# Patient Record
Sex: FEMALE | Race: BLACK OR AFRICAN AMERICAN | NOT HISPANIC OR LATINO
[De-identification: names, ages, dates, MRNs, and addresses within clinical notes are randomized per-mention and may not be internally consistent; named-entity substitution may affect disease eponyms.]

---

## 2017-01-10 ENCOUNTER — APPOINTMENT (OUTPATIENT)
Dept: CARDIOLOGY | Facility: CLINIC | Age: 72
End: 2017-01-10

## 2017-01-10 ENCOUNTER — NON-APPOINTMENT (OUTPATIENT)
Age: 72
End: 2017-01-10

## 2017-01-10 VITALS
SYSTOLIC BLOOD PRESSURE: 137 MMHG | BODY MASS INDEX: 32.9 KG/M2 | OXYGEN SATURATION: 100 % | HEIGHT: 63.5 IN | HEART RATE: 85 BPM | DIASTOLIC BLOOD PRESSURE: 78 MMHG | WEIGHT: 188 LBS | RESPIRATION RATE: 14 BRPM

## 2017-03-01 ENCOUNTER — APPOINTMENT (OUTPATIENT)
Dept: INTERNAL MEDICINE | Facility: CLINIC | Age: 72
End: 2017-03-01

## 2017-03-01 VITALS
BODY MASS INDEX: 33.95 KG/M2 | TEMPERATURE: 97.9 F | DIASTOLIC BLOOD PRESSURE: 72 MMHG | SYSTOLIC BLOOD PRESSURE: 154 MMHG | HEIGHT: 63.5 IN | HEART RATE: 88 BPM | WEIGHT: 194 LBS | OXYGEN SATURATION: 96 %

## 2017-03-01 VITALS — DIASTOLIC BLOOD PRESSURE: 60 MMHG | SYSTOLIC BLOOD PRESSURE: 130 MMHG

## 2017-03-01 DIAGNOSIS — Z63.4 DISAPPEARANCE AND DEATH OF FAMILY MEMBER: ICD-10-CM

## 2017-03-01 DIAGNOSIS — F43.20 ADJUSTMENT DISORDER, UNSPECIFIED: ICD-10-CM

## 2017-03-01 DIAGNOSIS — R00.2 PALPITATIONS: ICD-10-CM

## 2017-03-01 SDOH — SOCIAL STABILITY - SOCIAL INSECURITY: DISSAPEARANCE AND DEATH OF FAMILY MEMBER: Z63.4

## 2017-03-02 LAB
25(OH)D3 SERPL-MCNC: 29.8 NG/ML
ALBUMIN SERPL ELPH-MCNC: 4.1 G/DL
ALP BLD-CCNC: 87 U/L
ALT SERPL-CCNC: 11 U/L
ANION GAP SERPL CALC-SCNC: 17 MMOL/L
APPEARANCE: CLEAR
AST SERPL-CCNC: 9 U/L
BASOPHILS # BLD AUTO: 0.02 K/UL
BASOPHILS NFR BLD AUTO: 0.2 %
BILIRUB SERPL-MCNC: 0.5 MG/DL
BILIRUBIN URINE: NEGATIVE
BLOOD URINE: NEGATIVE
BUN SERPL-MCNC: 19 MG/DL
CALCIUM SERPL-MCNC: 10.2 MG/DL
CHLORIDE SERPL-SCNC: 103 MMOL/L
CHOLEST SERPL-MCNC: 155 MG/DL
CHOLEST/HDLC SERPL: 2.5 RATIO
CO2 SERPL-SCNC: 24 MMOL/L
COLOR: YELLOW
CREAT SERPL-MCNC: 0.82 MG/DL
CREAT SPEC-SCNC: 86 MG/DL
EOSINOPHIL # BLD AUTO: 0.06 K/UL
EOSINOPHIL NFR BLD AUTO: 0.6 %
GLUCOSE QUALITATIVE U: NORMAL MG/DL
GLUCOSE SERPL-MCNC: 144 MG/DL
HBA1C MFR BLD HPLC: 6.3 %
HCT VFR BLD CALC: 35.8 %
HCV AB SER QL: NONREACTIVE
HCV S/CO RATIO: 0.11 S/CO
HDLC SERPL-MCNC: 63 MG/DL
HGB BLD-MCNC: 11.8 G/DL
IMM GRANULOCYTES NFR BLD AUTO: 0.1 %
KETONES URINE: NEGATIVE
LDLC SERPL CALC-MCNC: 81 MG/DL
LEUKOCYTE ESTERASE URINE: NEGATIVE
LYMPHOCYTES # BLD AUTO: 3.21 K/UL
LYMPHOCYTES NFR BLD AUTO: 32.9 %
MAN DIFF?: NORMAL
MCHC RBC-ENTMCNC: 30.3 PG
MCHC RBC-ENTMCNC: 33 GM/DL
MCV RBC AUTO: 91.8 FL
MICROALBUMIN 24H UR DL<=1MG/L-MCNC: <0.3 MG/DL
MICROALBUMIN/CREAT 24H UR-RTO: NORMAL UG/MG
MONOCYTES # BLD AUTO: 0.52 K/UL
MONOCYTES NFR BLD AUTO: 5.3 %
NEUTROPHILS # BLD AUTO: 5.94 K/UL
NEUTROPHILS NFR BLD AUTO: 60.9 %
NITRITE URINE: NEGATIVE
PH URINE: 7.5
PLATELET # BLD AUTO: 399 K/UL
POTASSIUM SERPL-SCNC: 4.6 MMOL/L
PROT SERPL-MCNC: 7.3 G/DL
PROTEIN URINE: NEGATIVE MG/DL
RBC # BLD: 3.9 M/UL
RBC # FLD: 13.6 %
SODIUM SERPL-SCNC: 144 MMOL/L
SPECIFIC GRAVITY URINE: 1.02
TRIGL SERPL-MCNC: 53 MG/DL
TSH SERPL-ACNC: 1.39 UIU/ML
UROBILINOGEN URINE: 1 MG/DL
VIT B12 SERPL-MCNC: >2000 PG/ML
WBC # FLD AUTO: 9.76 K/UL

## 2017-03-06 ENCOUNTER — MEDICATION RENEWAL (OUTPATIENT)
Age: 72
End: 2017-03-06

## 2017-03-08 ENCOUNTER — MEDICATION RENEWAL (OUTPATIENT)
Age: 72
End: 2017-03-08

## 2017-04-07 ENCOUNTER — FORM ENCOUNTER (OUTPATIENT)
Age: 72
End: 2017-04-07

## 2017-04-08 ENCOUNTER — APPOINTMENT (OUTPATIENT)
Dept: MAMMOGRAPHY | Facility: IMAGING CENTER | Age: 72
End: 2017-04-08

## 2017-04-08 ENCOUNTER — OUTPATIENT (OUTPATIENT)
Dept: OUTPATIENT SERVICES | Facility: HOSPITAL | Age: 72
LOS: 1 days | End: 2017-04-08
Payer: MEDICARE

## 2017-04-08 DIAGNOSIS — Z00.8 ENCOUNTER FOR OTHER GENERAL EXAMINATION: ICD-10-CM

## 2017-04-08 PROCEDURE — G0202: CPT | Mod: 26

## 2017-04-08 PROCEDURE — 77067 SCR MAMMO BI INCL CAD: CPT

## 2017-04-08 PROCEDURE — 77063 BREAST TOMOSYNTHESIS BI: CPT | Mod: 26

## 2017-04-08 PROCEDURE — 77063 BREAST TOMOSYNTHESIS BI: CPT

## 2017-05-04 ENCOUNTER — RX RENEWAL (OUTPATIENT)
Age: 72
End: 2017-05-04

## 2017-06-19 ENCOUNTER — APPOINTMENT (OUTPATIENT)
Dept: INTERNAL MEDICINE | Facility: CLINIC | Age: 72
End: 2017-06-19

## 2017-06-19 ENCOUNTER — NON-APPOINTMENT (OUTPATIENT)
Age: 72
End: 2017-06-19

## 2017-06-19 VITALS
OXYGEN SATURATION: 95 % | HEART RATE: 87 BPM | DIASTOLIC BLOOD PRESSURE: 70 MMHG | SYSTOLIC BLOOD PRESSURE: 115 MMHG | WEIGHT: 202 LBS | TEMPERATURE: 98.5 F | BODY MASS INDEX: 35.35 KG/M2 | HEIGHT: 63.5 IN

## 2017-06-19 DIAGNOSIS — I49.49 OTHER PREMATURE DEPOLARIZATION: ICD-10-CM

## 2017-06-21 ENCOUNTER — LABORATORY RESULT (OUTPATIENT)
Age: 72
End: 2017-06-21

## 2017-06-21 LAB
ALBUMIN SERPL ELPH-MCNC: 4.1 G/DL
ALP BLD-CCNC: 89 U/L
ALT SERPL-CCNC: 10 U/L
ANION GAP SERPL CALC-SCNC: 14 MMOL/L
AST SERPL-CCNC: 15 U/L
BILIRUB SERPL-MCNC: 0.4 MG/DL
BUN SERPL-MCNC: 20 MG/DL
CALCIUM SERPL-MCNC: 10.2 MG/DL
CHLORIDE SERPL-SCNC: 101 MMOL/L
CO2 SERPL-SCNC: 25 MMOL/L
CREAT SERPL-MCNC: 0.94 MG/DL
GLUCOSE SERPL-MCNC: 137 MG/DL
POTASSIUM SERPL-SCNC: 4.3 MMOL/L
PROT SERPL-MCNC: 7.2 G/DL
SODIUM SERPL-SCNC: 140 MMOL/L
TSH SERPL-ACNC: 1.41 UIU/ML

## 2017-06-22 ENCOUNTER — MEDICATION RENEWAL (OUTPATIENT)
Age: 72
End: 2017-06-22

## 2017-07-03 ENCOUNTER — APPOINTMENT (OUTPATIENT)
Dept: INTERNAL MEDICINE | Facility: CLINIC | Age: 72
End: 2017-07-03

## 2017-07-03 VITALS
WEIGHT: 196 LBS | OXYGEN SATURATION: 97 % | TEMPERATURE: 98.5 F | HEART RATE: 92 BPM | DIASTOLIC BLOOD PRESSURE: 60 MMHG | SYSTOLIC BLOOD PRESSURE: 110 MMHG | HEIGHT: 63.5 IN | BODY MASS INDEX: 34.3 KG/M2

## 2017-07-03 DIAGNOSIS — K21.9 GASTRO-ESOPHAGEAL REFLUX DISEASE W/OUT ESOPHAGITIS: ICD-10-CM

## 2017-07-03 LAB — HBA1C MFR BLD HPLC: 6.8

## 2017-07-11 ENCOUNTER — APPOINTMENT (OUTPATIENT)
Dept: CARDIOLOGY | Facility: CLINIC | Age: 72
End: 2017-07-11

## 2017-07-11 ENCOUNTER — NON-APPOINTMENT (OUTPATIENT)
Age: 72
End: 2017-07-11

## 2017-07-11 VITALS — SYSTOLIC BLOOD PRESSURE: 110 MMHG | HEIGHT: 63.5 IN | DIASTOLIC BLOOD PRESSURE: 70 MMHG

## 2017-07-11 VITALS — HEART RATE: 80 BPM | OXYGEN SATURATION: 98 %

## 2017-07-11 VITALS — WEIGHT: 196 LBS | BODY MASS INDEX: 34.18 KG/M2

## 2017-07-18 ENCOUNTER — MEDICATION RENEWAL (OUTPATIENT)
Age: 72
End: 2017-07-18

## 2017-10-04 ENCOUNTER — APPOINTMENT (OUTPATIENT)
Dept: INTERNAL MEDICINE | Facility: CLINIC | Age: 72
End: 2017-10-04

## 2017-11-13 ENCOUNTER — RX RENEWAL (OUTPATIENT)
Age: 72
End: 2017-11-13

## 2018-01-24 ENCOUNTER — LABORATORY RESULT (OUTPATIENT)
Age: 73
End: 2018-01-24

## 2018-01-24 ENCOUNTER — APPOINTMENT (OUTPATIENT)
Dept: INTERNAL MEDICINE | Facility: CLINIC | Age: 73
End: 2018-01-24
Payer: MEDICARE

## 2018-01-24 VITALS
HEART RATE: 86 BPM | OXYGEN SATURATION: 97 % | BODY MASS INDEX: 33.95 KG/M2 | HEIGHT: 63.5 IN | SYSTOLIC BLOOD PRESSURE: 118 MMHG | DIASTOLIC BLOOD PRESSURE: 60 MMHG | WEIGHT: 194 LBS

## 2018-01-24 LAB — HBA1C MFR BLD HPLC: 6.7

## 2018-01-24 PROCEDURE — 99214 OFFICE O/P EST MOD 30 MIN: CPT | Mod: 25

## 2018-01-24 PROCEDURE — 83036 HEMOGLOBIN GLYCOSYLATED A1C: CPT | Mod: QW

## 2018-01-25 LAB
APPEARANCE: CLEAR
BILIRUBIN URINE: NEGATIVE
BLOOD URINE: NEGATIVE
COLOR: YELLOW
CREAT SPEC-SCNC: 62 MG/DL
GLUCOSE QUALITATIVE U: NEGATIVE MG/DL
KETONES URINE: NEGATIVE
LEUKOCYTE ESTERASE URINE: NEGATIVE
MICROALBUMIN 24H UR DL<=1MG/L-MCNC: <0.3 MG/DL
MICROALBUMIN/CREAT 24H UR-RTO: NORMAL
NITRITE URINE: NEGATIVE
PH URINE: 7
PROTEIN URINE: NEGATIVE MG/DL
SPECIFIC GRAVITY URINE: 1.01
UROBILINOGEN URINE: NEGATIVE MG/DL

## 2018-01-30 LAB — HEMOCCULT STL QL IA: NEGATIVE

## 2018-02-21 ENCOUNTER — MEDICATION RENEWAL (OUTPATIENT)
Age: 73
End: 2018-02-21

## 2018-03-09 ENCOUNTER — MEDICATION RENEWAL (OUTPATIENT)
Age: 73
End: 2018-03-09

## 2018-03-27 ENCOUNTER — MEDICATION RENEWAL (OUTPATIENT)
Age: 73
End: 2018-03-27

## 2018-04-13 ENCOUNTER — RX RENEWAL (OUTPATIENT)
Age: 73
End: 2018-04-13

## 2018-04-20 ENCOUNTER — FORM ENCOUNTER (OUTPATIENT)
Age: 73
End: 2018-04-20

## 2018-04-21 ENCOUNTER — OUTPATIENT (OUTPATIENT)
Dept: OUTPATIENT SERVICES | Facility: HOSPITAL | Age: 73
LOS: 1 days | End: 2018-04-21
Payer: MEDICARE

## 2018-04-21 ENCOUNTER — APPOINTMENT (OUTPATIENT)
Dept: MAMMOGRAPHY | Facility: IMAGING CENTER | Age: 73
End: 2018-04-21
Payer: MEDICARE

## 2018-04-21 DIAGNOSIS — Z00.8 ENCOUNTER FOR OTHER GENERAL EXAMINATION: ICD-10-CM

## 2018-04-21 PROCEDURE — 77067 SCR MAMMO BI INCL CAD: CPT | Mod: 26

## 2018-04-21 PROCEDURE — 77063 BREAST TOMOSYNTHESIS BI: CPT

## 2018-04-21 PROCEDURE — 77067 SCR MAMMO BI INCL CAD: CPT

## 2018-04-21 PROCEDURE — 77063 BREAST TOMOSYNTHESIS BI: CPT | Mod: 26

## 2018-04-27 ENCOUNTER — LABORATORY RESULT (OUTPATIENT)
Age: 73
End: 2018-04-27

## 2018-04-27 ENCOUNTER — APPOINTMENT (OUTPATIENT)
Dept: INTERNAL MEDICINE | Facility: CLINIC | Age: 73
End: 2018-04-27
Payer: MEDICARE

## 2018-04-27 VITALS
SYSTOLIC BLOOD PRESSURE: 115 MMHG | HEART RATE: 81 BPM | BODY MASS INDEX: 34.65 KG/M2 | DIASTOLIC BLOOD PRESSURE: 80 MMHG | TEMPERATURE: 98.1 F | HEIGHT: 63.5 IN | OXYGEN SATURATION: 97 % | WEIGHT: 198 LBS

## 2018-04-27 DIAGNOSIS — K80.20 CALCULUS OF GALLBLADDER W/OUT CHOLECYSTITIS W/OUT OBSTRUCTION: ICD-10-CM

## 2018-04-27 DIAGNOSIS — E78.00 PURE HYPERCHOLESTEROLEMIA, UNSPECIFIED: ICD-10-CM

## 2018-04-27 DIAGNOSIS — N28.1 CYST OF KIDNEY, ACQUIRED: ICD-10-CM

## 2018-04-27 PROCEDURE — 36415 COLL VENOUS BLD VENIPUNCTURE: CPT

## 2018-04-27 PROCEDURE — G0439: CPT

## 2018-04-30 LAB
25(OH)D3 SERPL-MCNC: 35.7 NG/ML
ALBUMIN SERPL ELPH-MCNC: 3.9 G/DL
ALP BLD-CCNC: 85 U/L
ALT SERPL-CCNC: 10 U/L
ANION GAP SERPL CALC-SCNC: 15 MMOL/L
APPEARANCE: CLEAR
AST SERPL-CCNC: 13 U/L
BASOPHILS # BLD AUTO: 0.02 K/UL
BASOPHILS NFR BLD AUTO: 0.2 %
BILIRUB SERPL-MCNC: 0.6 MG/DL
BILIRUBIN URINE: NEGATIVE
BLOOD URINE: ABNORMAL
BUN SERPL-MCNC: 13 MG/DL
CALCIUM SERPL-MCNC: 9.8 MG/DL
CHLORIDE SERPL-SCNC: 106 MMOL/L
CHOLEST SERPL-MCNC: 132 MG/DL
CHOLEST/HDLC SERPL: 2.3 RATIO
CO2 SERPL-SCNC: 22 MMOL/L
COLOR: YELLOW
CREAT SERPL-MCNC: 0.74 MG/DL
EOSINOPHIL # BLD AUTO: 0.08 K/UL
EOSINOPHIL NFR BLD AUTO: 0.9 %
GLUCOSE QUALITATIVE U: NEGATIVE MG/DL
GLUCOSE SERPL-MCNC: 126 MG/DL
HBA1C MFR BLD HPLC: 6.4 %
HCT VFR BLD CALC: 36.1 %
HCV AB SER QL: NONREACTIVE
HCV S/CO RATIO: 0.1 S/CO
HDLC SERPL-MCNC: 58 MG/DL
HGB BLD-MCNC: 11.8 G/DL
IMM GRANULOCYTES NFR BLD AUTO: 0.2 %
KETONES URINE: NEGATIVE
LDLC SERPL CALC-MCNC: 62 MG/DL
LEUKOCYTE ESTERASE URINE: NEGATIVE
LYMPHOCYTES # BLD AUTO: 3.13 K/UL
LYMPHOCYTES NFR BLD AUTO: 34 %
MAN DIFF?: NORMAL
MCHC RBC-ENTMCNC: 30 PG
MCHC RBC-ENTMCNC: 32.7 GM/DL
MCV RBC AUTO: 91.9 FL
MONOCYTES # BLD AUTO: 0.5 K/UL
MONOCYTES NFR BLD AUTO: 5.4 %
NEUTROPHILS # BLD AUTO: 5.46 K/UL
NEUTROPHILS NFR BLD AUTO: 59.3 %
NITRITE URINE: NEGATIVE
PH URINE: 7.5
PLATELET # BLD AUTO: 345 K/UL
POTASSIUM SERPL-SCNC: 4.5 MMOL/L
PROT SERPL-MCNC: 6.8 G/DL
PROTEIN URINE: NEGATIVE MG/DL
RBC # BLD: 3.93 M/UL
RBC # FLD: 13 %
SODIUM SERPL-SCNC: 143 MMOL/L
SPECIFIC GRAVITY URINE: 1.02
TRIGL SERPL-MCNC: 62 MG/DL
TSH SERPL-ACNC: 1.94 UIU/ML
UROBILINOGEN URINE: NEGATIVE MG/DL
VIT B12 SERPL-MCNC: >2000 PG/ML
WBC # FLD AUTO: 9.21 K/UL

## 2018-07-16 ENCOUNTER — APPOINTMENT (OUTPATIENT)
Dept: CARDIOLOGY | Facility: CLINIC | Age: 73
End: 2018-07-16

## 2018-07-27 ENCOUNTER — NON-APPOINTMENT (OUTPATIENT)
Age: 73
End: 2018-07-27

## 2018-07-27 ENCOUNTER — APPOINTMENT (OUTPATIENT)
Dept: INTERNAL MEDICINE | Facility: CLINIC | Age: 73
End: 2018-07-27
Payer: MEDICARE

## 2018-07-27 VITALS
DIASTOLIC BLOOD PRESSURE: 60 MMHG | TEMPERATURE: 98.1 F | SYSTOLIC BLOOD PRESSURE: 120 MMHG | HEART RATE: 84 BPM | BODY MASS INDEX: 33.77 KG/M2 | HEIGHT: 63.5 IN | OXYGEN SATURATION: 98 % | WEIGHT: 193 LBS

## 2018-07-27 PROCEDURE — 99214 OFFICE O/P EST MOD 30 MIN: CPT | Mod: 25

## 2018-07-27 PROCEDURE — 36415 COLL VENOUS BLD VENIPUNCTURE: CPT

## 2018-07-27 PROCEDURE — 93000 ELECTROCARDIOGRAM COMPLETE: CPT

## 2018-07-27 NOTE — HISTORY OF PRESENT ILLNESS
[Family Member] : family member [Other: _____] : [unfilled] [FreeTextEntry1] : came in for follow up \par \par not sleeping at night - finding problems with every little things , depressed -low mood - sad feeling - not caring for self - lives alone at home since her   2 yrs ago -her daughter works full time - she does not want to go live with her \par - melatonin does not help \par - needs some thing stronger \par -Spouse  -2016 \par - no thoughts of suicide or homicide \par - as per daughter she has noticed her to be sitting lonley and remembering thing again \par - she will have her see psychologist who speaks her language , needs referral \par \par lower flank pain and urine symptoms - saw urologist - did xray and ct abd pelvis , renal cyst 2 left small , cholelithiasis , DJD spine \par \par Diabetic- monitors Accu-Cheks readings- 129-130  mainly, no values in 200, saw Ophth 2 weeks ago -stable + retinopathy , and glaucoma , did b/l cataract Sx , denies any hypoglycemic episode.\par refused flu vaccine and Pneumovax \par - taking metformin 500 twice a day as her sugars were good , in past was on 1000 bid \par \par Hyperlipidemia- taking cholesterol medication , no muscle pain. \par \par Hypertension- no CP , sob, palpitation or dizzy spells, compliant with medication , saw cardio Had stress and echo that were normal. No other complaints. Began taking asa 81 mg daily. \par \par GERD- acid reflux with certain foods occ. after eating , was see n in  for chest discomfort - her  omeprazole was d/christopher and started on pepcid - she is doing well with it , no cp , son palpitations  \par \par Obesity-lost 4 lbs since last visit , eating and not exercising \par \par

## 2018-07-27 NOTE — ASSESSMENT
[FreeTextEntry1] : \par Depression/ anxiety - PHQ-9 score 10 \par Mini-Mental score 29/30 negative \par -start Remeron 7.5 po qhs increase to 15 if tolerated in 1 month f/u 6 weeks \par -Depression screening done at this visit. 15 minute spent in assessment and review.\par -Denies homicidal or suicidal ideas or thoughts\par -Discussed with patient in detail side effects of all medications, hand out given, advise patient to inform family member that he or she is taking the  medication and to watch out for any personality changes, to seek medical attention immediately if they notice any difference. \par -Make appointment to see psychiatrist and therapist on a regular basis.\par \par Diabetes-get aic \par -on metformin to 500 po bid , rtc 3 months. Monitor Accu-Cheks daily, - Monitor for signs of hypoglycemia. \par Continue 1800 kcal ADA diet, avoid rice, pasta, sugar, sweet, soda, juices, advised to exercise daily.\par -advised patient to continue walking and exercise.and loose weight \par - f/u ophtho \par \par Hyperlipidemia\par Controlled, continue current medications,\par Low-fat diet, avoid red meat, cheese, butter, peanuts and exercise daily for 40 minutes.\par \par Hypertension stable  - stress test echo negative , cardio consult reviewed 12/2016 \par -EKG- nsr at 75 bpm no acute st changes \par -Controlled, continue current medications, low sodium-DASH diet., discussed with patient avoid canned food, process food, fast food, also, advised to take 3-4 servings of fruits and vegetables a day.\par \par morbid obesity BMI 33- discussed with patient avoid carbohydrate and fat, encouraged patient to start exercising 40 minutes daily, lose weight.\par \par insomnia- sleep hygiene reviewed ,no help with  melatonin 4 mg increase to 8 mg daily qhs , reduce caffine intake after 7 pm \par -start Ambien 5 mg at bedtime PRN fall precaution discussed \par \par Cholilithiasis - refused sx \par \par Health Maintenance \par Flu, pneumovax, zostavax, tetanus vaccine - refused \par colonoscope- 2010, FIT 2016 neg ordered today [facility] again  has referral for gastro to make apt \par mammo- 4/2018 \par PAP- up to date as per pt

## 2018-07-30 LAB
APPEARANCE: CLEAR
BASOPHILS # BLD AUTO: 0.03 K/UL
BASOPHILS NFR BLD AUTO: 0.3 %
BILIRUBIN URINE: NEGATIVE
BLOOD URINE: NEGATIVE
COLOR: YELLOW
EOSINOPHIL # BLD AUTO: 0.07 K/UL
EOSINOPHIL NFR BLD AUTO: 0.7 %
GLUCOSE QUALITATIVE U: NEGATIVE MG/DL
HBA1C MFR BLD HPLC: 6.4 %
HCT VFR BLD CALC: 37.9 %
HGB BLD-MCNC: 12.2 G/DL
IMM GRANULOCYTES NFR BLD AUTO: 0.2 %
KETONES URINE: NEGATIVE
LEUKOCYTE ESTERASE URINE: NEGATIVE
LYMPHOCYTES # BLD AUTO: 3.51 K/UL
LYMPHOCYTES NFR BLD AUTO: 34.5 %
MAN DIFF?: NORMAL
MCHC RBC-ENTMCNC: 29.3 PG
MCHC RBC-ENTMCNC: 32.2 GM/DL
MCV RBC AUTO: 91.1 FL
MONOCYTES # BLD AUTO: 0.57 K/UL
MONOCYTES NFR BLD AUTO: 5.6 %
NEUTROPHILS # BLD AUTO: 5.97 K/UL
NEUTROPHILS NFR BLD AUTO: 58.7 %
NITRITE URINE: NEGATIVE
PH URINE: 7
PLATELET # BLD AUTO: 398 K/UL
PROTEIN URINE: NEGATIVE MG/DL
RBC # BLD: 4.16 M/UL
RBC # FLD: 13.3 %
SPECIFIC GRAVITY URINE: 1.02
TSH SERPL-ACNC: 1.15 UIU/ML
UROBILINOGEN URINE: 1 MG/DL
WBC # FLD AUTO: 10.17 K/UL

## 2018-07-31 ENCOUNTER — MEDICATION RENEWAL (OUTPATIENT)
Age: 73
End: 2018-07-31

## 2018-08-03 RX ORDER — ZOLPIDEM TARTRATE 5 MG/1
5 TABLET ORAL
Qty: 15 | Refills: 0 | Status: DISCONTINUED | COMMUNITY
Start: 2018-07-27 | End: 2018-08-03

## 2018-08-07 ENCOUNTER — RX RENEWAL (OUTPATIENT)
Age: 73
End: 2018-08-07

## 2018-09-05 ENCOUNTER — APPOINTMENT (OUTPATIENT)
Dept: INTERNAL MEDICINE | Facility: CLINIC | Age: 73
End: 2018-09-05
Payer: MEDICARE

## 2018-09-05 VITALS
OXYGEN SATURATION: 99 % | TEMPERATURE: 98.7 F | WEIGHT: 191 LBS | BODY MASS INDEX: 33.42 KG/M2 | HEIGHT: 63.5 IN | SYSTOLIC BLOOD PRESSURE: 105 MMHG | HEART RATE: 87 BPM | DIASTOLIC BLOOD PRESSURE: 60 MMHG

## 2018-09-05 PROCEDURE — 99214 OFFICE O/P EST MOD 30 MIN: CPT

## 2018-09-05 NOTE — ASSESSMENT
[FreeTextEntry1] : \par Depression/ anxiety - PHQ-9 score 10 \par Mini-Mental score 29/30 negative \par -continue  Remeron 7.5 po qhs 6 months refill \par -Depression screening done at this visit. 15 minute spent in assessment and review.\par -Denies homicidal or suicidal ideas or thoughts\par -Discussed with patient in detail side effects of all medications, hand out given, advise patient to inform family member that he or she is taking the  medication and to watch out for any personality changes, to seek medical attention immediately if they notice any difference. \par -Make appointment to see psychiatrist and therapist on a regular basis.\par \par Diabetes-Aic was 6.4 7/2018 \par -on metformin to 500 po bid , rtc 3 months. Monitor Accu-Cheks daily, - Monitor for signs of hypoglycemia. \par Continue 1800 kcal ADA diet, avoid rice, pasta, sugar, sweet, soda, juices, advised to exercise daily.\par -advised patient to continue walking and exercise.and loose weight \par - f/u ophtho \par \par Hyperlipidemia\par Controlled, continue current medications,\par Low-fat diet, avoid red meat, cheese, butter, peanuts and exercise daily for 40 minutes.\par \par Hypertension stable  - stress test echo negative , cardio consult reviewed 12/2016 \par -EKG- nsr at 75 bpm no acute st changes \par -Controlled, continue current medications, low sodium-DASH diet., discussed with patient avoid canned food, process food, fast food, also, advised to take 3-4 servings of fruits and vegetables a day.\par \par morbid obesity BMI 33- discussed with patient avoid carbohydrate and fat, encouraged patient to start exercising 40 minutes daily, lose weight.\par \par insomnia- sleep hygiene reviewed ,no help with  melatonin 4 mg increase to 8 mg daily qhs , reduce caffine intake after 7 pm \par -on tamezepam as needed at bedtime PRN fall precaution discussed \par \par Cholelithiasis - refused sx \par \par Health Maintenance \par Flu, pneumovax, zostavax, tetanus vaccine - refused \par colonoscope- 2010, FIT 2016 neg ordered today [facility] again  has referral for gastro to make apt \par mammo- 4/2018 \par PAP- up to date as per pt

## 2018-09-05 NOTE — HISTORY OF PRESENT ILLNESS
[Other: _____] : [unfilled] [FreeTextEntry1] : came in for follow up \par \par sleeping better at night - \par -taking temazepam as needed - so far only took 3 pills \par - Remeron 7.5 seem to work needs refill \par -- finding problems with every little things , depressed -low mood - sad feeling - not caring for self - lives alone at home since her   2 yrs ago -her daughter works full time - she does not want to go live with her \par - melatonin does not help \par - needs some thing stronger \par -Spouse  -2016 \par - no thoughts of suicide or homicide \par - as per daughter she has noticed her to be sitting lonely and remembering thing again \par - she will have her see psychologist who speaks her language , needs referral \par \par lower flank pain and urine symptoms - saw urologist - did xray and ct abd pelvis , renal cyst 2 left small , cholelithiasis , DJD spine \par \par Diabetic- monitors Accu-Cheks readings- 129-130  mainly, no values in 200, saw Ophth 2 weeks ago -stable + retinopathy , and glaucoma , did b/l cataract Sx , denies any hypoglycemic episode.\par refused flu vaccine and Pneumovax \par - taking metformin 500 twice a day as her sugars were good , in past was on 1000 bid \par \par Hyperlipidemia- taking cholesterol medication , no muscle pain. \par \par Hypertension- no CP , sob, palpitation or dizzy spells, compliant with medication , saw cardio Had stress and echo that were normal. No other complaints. Began taking asa 81 mg daily. \par \par GERD- acid reflux with certain foods occ. after eating , was see n in  for chest discomfort - her  omeprazole was d/christopher and started on pepcid - she is doing well with it , no cp , son palpitations  \par \par Obesity-lost 4 lbs since last visit , eating and not exercising \par \par

## 2018-12-07 ENCOUNTER — APPOINTMENT (OUTPATIENT)
Dept: INTERNAL MEDICINE | Facility: CLINIC | Age: 73
End: 2018-12-07
Payer: MEDICARE

## 2018-12-07 VITALS
OXYGEN SATURATION: 98 % | WEIGHT: 182 LBS | BODY MASS INDEX: 31.85 KG/M2 | HEIGHT: 63.5 IN | DIASTOLIC BLOOD PRESSURE: 70 MMHG | SYSTOLIC BLOOD PRESSURE: 144 MMHG | TEMPERATURE: 98.4 F | HEART RATE: 74 BPM

## 2018-12-07 DIAGNOSIS — M25.561 PAIN IN RIGHT KNEE: ICD-10-CM

## 2018-12-07 DIAGNOSIS — M25.562 PAIN IN RIGHT KNEE: ICD-10-CM

## 2018-12-07 LAB
APPEARANCE: CLEAR
BILIRUBIN URINE: NEGATIVE
BLOOD URINE: NEGATIVE
COLOR: YELLOW
GLUCOSE QUALITATIVE U: NEGATIVE MG/DL
HBA1C MFR BLD HPLC: 6.4
KETONES URINE: NEGATIVE
LEUKOCYTE ESTERASE URINE: NEGATIVE
NITRITE URINE: NEGATIVE
PH URINE: 7.5
PROTEIN URINE: NEGATIVE MG/DL
SPECIFIC GRAVITY URINE: 1.01
UROBILINOGEN URINE: NEGATIVE MG/DL

## 2018-12-07 PROCEDURE — 83036 HEMOGLOBIN GLYCOSYLATED A1C: CPT | Mod: QW

## 2018-12-07 PROCEDURE — 99214 OFFICE O/P EST MOD 30 MIN: CPT | Mod: 25

## 2018-12-07 NOTE — HISTORY OF PRESENT ILLNESS
[Family Member] : family member [Other: _____] : [unfilled] [FreeTextEntry1] : came in for follow up \par \par sleeping better at night - \par -taking temazepam as needed \par - Remeron 7.5 seem to work needs refill \par -- finding problems with every little things , depressed -low mood - sad feeling - not caring for self - lives alone at home since her   2 yrs ago -her daughter works full time - she does not want to go live with her \par - melatonin does not help \par - needs some thing stronger \par -Spouse  -2016 \par - no thoughts of suicide or homicide \par - as per daughter she has noticed her to be sitting lonely and remembering thing again \par - she will have her see psychologist who speaks her language , needs referral \par - cannot do things herself living alone - needs help with BADL - cooking and grooming - 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 \par \par Hypertension- no CP , sob, palpitation or dizzy spells, compliant with medication , saw cardio Had stress and echo that were normal. No other complaints. Began taking asa 81 mg daily. \par \par GERD- acid reflux with certain foods occ. after eating , was see n in  for chest discomfort - her  omeprazole was d/christopher and started on pepcid - she is doing well with it , no cp , son palpitations  \par \par Obesity-lost 4 lbs since last visit , eating and not exercising \par \par

## 2018-12-07 NOTE — HEALTH RISK ASSESSMENT
[0] : 2) Feeling down, depressed, or hopeless: Not at all (0) [(PHQ-2) Unable to screen] : PHQ-2: unable to screen [[id number]] : 0

## 2018-12-07 NOTE — ASSESSMENT
[FreeTextEntry1] : \par Depression/ anxiety - better now smiling more engaged \par Mini-Mental score 29/30 negative \par -continue  Remeron 7.5 po qhs 6 months refill \par -Depression screening done at this visit. 15 minute spent in assessment and review.\par -Denies homicidal or suicidal ideas or thoughts\par -Discussed with patient in detail side effects of all medications, hand out given, advise patient to inform family member that he or she is taking the  medication and to watch out for any personality changes, to seek medical attention immediately if they notice any difference. \par -Make appointment to see psychiatrist and therapist on a regular basis.\par \par Diabetes-POC AIC - 6.4 stable \par -on metformin to 500 po bid , rtc 3 months. Monitor Accu-Cheks daily, - Monitor for signs of hypoglycemia. \par Continue 1800 kcal ADA diet, avoid rice, pasta, sugar, sweet, soda, juices, advised to exercise daily.\par -advised patient to continue walking and exercise.and loose weight \par - f/u ophtho \par \par Hyperlipidemia\par Controlled, continue current medications,\par Low-fat diet, avoid red meat, cheese, butter, peanuts and exercise daily for 40 minutes.\par \par Hypertension stable  - stress test echo negative , cardio consult reviewed 7/2017 \par -EKG- nsr at 75 bpm no acute st changes \par -Controlled, continue current medications, low sodium-DASH diet., discussed with patient avoid canned food, process food, fast food, also, advised to take 3-4 servings of fruits and vegetables a day.\par \par morbid obesity BMI 33- discussed with patient avoid carbohydrate and fat, encouraged patient to start exercising 40 minutes daily, lose weight.\par \par insomnia- sleep hygiene reviewed ,no help with  melatonin 4 mg increase to 8 mg daily qhs , reduce caffine intake after 7 pm \par -on tamezepam as needed at bedtime PRN fall precaution discussed \par \par Cholelithiasis - refused sx \par \par Health Maintenance \par Flu, pneumovax, zostavax, tetanus vaccine - refused \par colonoscope- 2010, FIT 2018 neg, advised colonoscope referral to gi given \par mammo- 4/2018 \par PAP- up to date as per pt \par \par rtc cpe

## 2018-12-11 ENCOUNTER — RX RENEWAL (OUTPATIENT)
Age: 73
End: 2018-12-11

## 2019-01-03 ENCOUNTER — MEDICATION RENEWAL (OUTPATIENT)
Age: 74
End: 2019-01-03

## 2019-01-30 ENCOUNTER — APPOINTMENT (OUTPATIENT)
Dept: INTERNAL MEDICINE | Facility: CLINIC | Age: 74
End: 2019-01-30

## 2019-03-04 ENCOUNTER — APPOINTMENT (OUTPATIENT)
Dept: INTERNAL MEDICINE | Facility: CLINIC | Age: 74
End: 2019-03-04
Payer: MEDICARE

## 2019-03-04 VITALS
WEIGHT: 184 LBS | TEMPERATURE: 98.3 F | OXYGEN SATURATION: 98 % | SYSTOLIC BLOOD PRESSURE: 132 MMHG | HEIGHT: 63.5 IN | HEART RATE: 84 BPM | DIASTOLIC BLOOD PRESSURE: 66 MMHG | BODY MASS INDEX: 32.2 KG/M2

## 2019-03-04 VITALS — WEIGHT: 194 LBS | BODY MASS INDEX: 33.83 KG/M2

## 2019-03-04 LAB — HBA1C MFR BLD HPLC: 6.3

## 2019-03-04 PROCEDURE — 83036 HEMOGLOBIN GLYCOSYLATED A1C: CPT | Mod: QW

## 2019-03-04 PROCEDURE — 99214 OFFICE O/P EST MOD 30 MIN: CPT | Mod: 25

## 2019-03-04 RX ORDER — FAMOTIDINE 20 MG/1
20 TABLET, FILM COATED ORAL
Qty: 90 | Refills: 0 | Status: COMPLETED | COMMUNITY
Start: 2018-12-07 | End: 2019-03-04

## 2019-03-04 NOTE — REVIEW OF SYSTEMS
[Negative] : Gastrointestinal [Chest Pain] : no chest pain [Lower Ext Edema] : no lower extremity edema [Shortness Of Breath] : no shortness of breath [Dyspnea on Exertion] : no dyspnea on exertion [Abdominal Pain] : no abdominal pain

## 2019-03-04 NOTE — HISTORY OF PRESENT ILLNESS
[Other: _____] : [unfilled] [FreeTextEntry1] : came in for follow up needs HHA forms filled \par \par sleeping better at night - \par -taking temazepam as needed \par - Remeron 7.5 seem to work needs refill - no side effects \par -- finding problems with every little things , depressed -low mood - sad feeling - not caring for self - lives alone at home since her   2 yrs ago -her daughter works full time - she does not want to go live with her \par - melatonin does not help \par - needs some thing stronger \par -Spouse  -2016 \par - no thoughts of suicide or homicide \par - as per daughter she has noticed her to be sitting lonely and remembering thing again \par - she will have her see psychologist who speaks her language , needs referral \par - cannot do things herself living alone - needs help with BADL - cooking and grooming - 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 \par \par Hypertension- no CP , sob, palpitation or dizzy spells, compliant with medication , saw cardio Had stress and echo that were normal. No other complaints. Began taking asa 81 mg daily. \par \par GERD- acid reflux with certain foods occ. after eating , was see n in  for chest discomfort - her  omeprazole was d/christopher and started on pepcid - she is doing well with it , no cp , son palpitations  \par \par Obesity-lost 4 lbs since last visit , eating and not exercising \par \par

## 2019-03-04 NOTE — ASSESSMENT
[FreeTextEntry1] : \par Depression/ anxiety - better now smiling more engaged -no side effects \par -continue  Remeron 7.5 po qhs 6 months refill \par -Depression screening done at this visit. 15 minute spent in assessment and review.\par -Denies homicidal or suicidal ideas or thoughts\par -Discussed with patient in detail side effects of all medications, hand out given, advise patient to inform family member that he or she is taking the  medication and to watch out for any personality changes, to seek medical attention immediately if they notice any difference. \par -Make appointment to see psychiatrist and therapist on a regular basis.\par \par Diabetes-POC AIC - 6.3 stable \par -on metformin to 500 po bid , rtc 3 months. Monitor Accu-Cheks daily, - Monitor for signs of hypoglycemia. \par Continue 1800 kcal ADA diet, avoid rice, pasta, sugar, sweet, soda, juices, advised to exercise daily.\par -advised patient to continue walking and exercise.and loose weight \par - f/u ophtho last visit was 10/18/18 \par \par Hyperlipidemia\par Controlled, continue current medications,\par Low-fat diet, avoid red meat, cheese, butter, peanuts and exercise daily for 40 minutes.\par \par Hypertension stable  - stress test echo negative , cardio consult reviewed 7/2017 \par -Controlled, continue current medications, low sodium-DASH diet., discussed with patient avoid canned food, process food, fast food, also, advised to take 3-4 servings of fruits and vegetables a day.\par \par morbid obesity BMI 33- discussed with patient avoid carbohydrate and fat, encouraged patient to start exercising 40 minutes daily, lose weight.\par \par insomnia- sleep hygiene reviewed ,no help with  melatonin 4 mg increase to 8 mg daily qhs , reduce caffine intake after 7 pm \par -on tamezepam as needed at bedtime PRN fall precaution discussed \par \par Cholelithiasis - refused sx \par \par Health Maintenance \par Flu, pneumovax, zostavax, tetanus vaccine - refused \par colonoscope- 2010, FIT 2018 neg, advised colonoscope referral to gi given \par mammo- 4/2018 \par PAP- up to date as per pt \par \par rtc cpe

## 2019-05-14 ENCOUNTER — MEDICATION RENEWAL (OUTPATIENT)
Age: 74
End: 2019-05-14

## 2019-05-15 ENCOUNTER — MEDICATION RENEWAL (OUTPATIENT)
Age: 74
End: 2019-05-15

## 2019-06-07 ENCOUNTER — MEDICATION RENEWAL (OUTPATIENT)
Age: 74
End: 2019-06-07

## 2019-06-12 ENCOUNTER — APPOINTMENT (OUTPATIENT)
Dept: INTERNAL MEDICINE | Facility: CLINIC | Age: 74
End: 2019-06-12
Payer: MEDICARE

## 2019-06-12 VITALS
BODY MASS INDEX: 35.17 KG/M2 | TEMPERATURE: 97.9 F | SYSTOLIC BLOOD PRESSURE: 130 MMHG | OXYGEN SATURATION: 98 % | HEIGHT: 63.5 IN | DIASTOLIC BLOOD PRESSURE: 70 MMHG | WEIGHT: 201 LBS | HEART RATE: 84 BPM

## 2019-06-12 PROCEDURE — 99214 OFFICE O/P EST MOD 30 MIN: CPT | Mod: 25

## 2019-06-12 PROCEDURE — 36415 COLL VENOUS BLD VENIPUNCTURE: CPT

## 2019-06-12 NOTE — PHYSICAL EXAM
[No Acute Distress] : no acute distress [Well Nourished] : well nourished [Well Developed] : well developed [No Respiratory Distress] : no respiratory distress  [Clear to Auscultation] : lungs were clear to auscultation bilaterally [No Accessory Muscle Use] : no accessory muscle use [Normal Rate] : normal rate  [Normal S1, S2] : normal S1 and S2 [Regular Rhythm] : with a regular rhythm [Soft] : abdomen soft [Non Tender] : non-tender [Normal Bowel Sounds] : normal bowel sounds

## 2019-06-12 NOTE — HISTORY OF PRESENT ILLNESS
[FreeTextEntry1] : came in 22 min late for CPE seen today as f/u for ch medical conditions \par \par sleeping better at night - \par -taking temazepam as needed \par - Remeron 7.5 seem to work needs refill - no side effects \par -- finding problems with every little things , depressed -low mood - sad feeling - not caring for self - lives alone at home since her   2 yrs ago -her daughter works full time - she does not want to go live with her \par - melatonin does not help \par - needs some thing stronger \par -Spouse  -2016 \par - no thoughts of suicide or homicide \par - as per daughter she has noticed her to be sitting lonely and remembering thing again \par - she will have her see psychologist who speaks her language , needs referral \par - cannot do things herself living alone - needs help with BADL - cooking and grooming - 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 \par \par Hypertension- no CP , sob, palpitation or dizzy spells, compliant with medication , saw cardio Had stress and echo that were normal. No other complaints. Began taking asa 81 mg daily. \par \par GERD- acid reflux with certain foods occ. after eating , was see n in  for chest discomfort - her  omeprazole was d/christopher and started on pepcid - she is doing well with it , no cp , son palpitations  \par \par Obesity-lost 4 lbs since last visit , eating and not exercising \par \par

## 2019-06-12 NOTE — REVIEW OF SYSTEMS
[Negative] : Gastrointestinal [Chest Pain] : no chest pain [Lower Ext Edema] : no lower extremity edema [Shortness Of Breath] : no shortness of breath [Abdominal Pain] : no abdominal pain [Dyspnea on Exertion] : no dyspnea on exertion

## 2019-06-12 NOTE — ASSESSMENT
[FreeTextEntry1] : BMI - 35 gained 22 lbs in 6 months \par -check lipids, AIC , TSH \par - discussed caloric control , portion control , weight loss, increase physical activity\par \par Depression/ anxiety - better now smiling more engaged -no side effects \par -continue  Remeron 7.5 po qhs 6 months refill \par -Depression screening done at this visit. 15 minute spent in assessment and review.\par -Denies homicidal or suicidal ideas or thoughts\par -Discussed with patient in detail side effects of all medications, hand out given, advise patient to inform family member that he or she is taking the  medication and to watch out for any personality changes, to seek medical attention immediately if they notice any difference. \par -Make appointment to see psychiatrist and therapist on a regular basis.\par \par Diabetes-POC AIC - 6.3 stable 3/2019 - has appt ophtho next week \par -on metformin to 500 po bid , rtc 3 months. Monitor Accu-Cheks daily, - Monitor for signs of hypoglycemia. \par Continue 1800 kcal ADA diet, avoid rice, pasta, sugar, sweet, soda, juices, advised to exercise daily.\par -advised patient to continue walking and exercise.and loose weight \par - f/u ophtho last visit was 10/18/18 \par \par Hyperlipidemia\par Controlled, continue current medications,\par Low-fat diet, avoid red meat, cheese, butter, peanuts and exercise daily for 40 minutes.\par \par Hypertension stable  - stress test echo negative , cardio consult reviewed 7/2017 \par -Controlled, continue current medications, low sodium-DASH diet., discussed with patient avoid canned food, process food, fast food, also, advised to take 3-4 servings of fruits and vegetables a day.\par \par morbid obesity BMI 33- discussed with patient avoid carbohydrate and fat, encouraged patient to start exercising 40 minutes daily, lose weight.\par \par insomnia- sleep hygiene reviewed ,no help with  melatonin 4 mg increase to 8 mg daily qhs , reduce caffine intake after 7 pm \par -on temazepam as needed at bedtime PRN fall precaution discussed \par \par Cholelithiasis - refused sx \par \par Health Maintenance \par Flu, pneumovax, zostavax, tetanus vaccine - refused \par colonoscope- 2010, FIT 2018 neg, advised colonoscope referral to gi given \par mammo- 4/2018 - referral given \par PAP- up to date as per pt \par \par rtc cpe

## 2019-06-13 LAB
25(OH)D3 SERPL-MCNC: 38.8 NG/ML
ALBUMIN SERPL ELPH-MCNC: 4.2 G/DL
ALP BLD-CCNC: 78 U/L
ALT SERPL-CCNC: 14 U/L
ANION GAP SERPL CALC-SCNC: 12 MMOL/L
APPEARANCE: CLEAR
AST SERPL-CCNC: 16 U/L
BILIRUB SERPL-MCNC: 0.4 MG/DL
BILIRUBIN URINE: NEGATIVE
BLOOD URINE: NEGATIVE
BUN SERPL-MCNC: 19 MG/DL
CALCIUM SERPL-MCNC: 10.1 MG/DL
CHLORIDE SERPL-SCNC: 102 MMOL/L
CHOLEST SERPL-MCNC: 138 MG/DL
CHOLEST/HDLC SERPL: 2.4 RATIO
CO2 SERPL-SCNC: 26 MMOL/L
COLOR: NORMAL
CREAT SERPL-MCNC: 0.79 MG/DL
ESTIMATED AVERAGE GLUCOSE: 137 MG/DL
GLUCOSE QUALITATIVE U: NEGATIVE
GLUCOSE SERPL-MCNC: 133 MG/DL
HBA1C MFR BLD HPLC: 6.4 %
HDLC SERPL-MCNC: 57 MG/DL
KETONES URINE: NEGATIVE
LDLC SERPL CALC-MCNC: 71 MG/DL
LEUKOCYTE ESTERASE URINE: NEGATIVE
NITRITE URINE: NEGATIVE
PH URINE: 6.5
POTASSIUM SERPL-SCNC: 4.3 MMOL/L
PROT SERPL-MCNC: 7 G/DL
PROTEIN URINE: NEGATIVE
SODIUM SERPL-SCNC: 140 MMOL/L
SPECIFIC GRAVITY URINE: 1.02
TRIGL SERPL-MCNC: 50 MG/DL
TSH SERPL-ACNC: 1.69 UIU/ML
UROBILINOGEN URINE: NORMAL
VIT B12 SERPL-MCNC: >2000 PG/ML

## 2019-06-26 ENCOUNTER — FORM ENCOUNTER (OUTPATIENT)
Age: 74
End: 2019-06-26

## 2019-06-27 ENCOUNTER — APPOINTMENT (OUTPATIENT)
Dept: MAMMOGRAPHY | Facility: IMAGING CENTER | Age: 74
End: 2019-06-27
Payer: MEDICARE

## 2019-06-27 ENCOUNTER — OUTPATIENT (OUTPATIENT)
Dept: OUTPATIENT SERVICES | Facility: HOSPITAL | Age: 74
LOS: 1 days | End: 2019-06-27
Payer: MEDICARE

## 2019-06-27 DIAGNOSIS — Z00.00 ENCOUNTER FOR GENERAL ADULT MEDICAL EXAMINATION WITHOUT ABNORMAL FINDINGS: ICD-10-CM

## 2019-06-27 PROCEDURE — 77063 BREAST TOMOSYNTHESIS BI: CPT

## 2019-06-27 PROCEDURE — 77063 BREAST TOMOSYNTHESIS BI: CPT | Mod: 26

## 2019-06-27 PROCEDURE — 77067 SCR MAMMO BI INCL CAD: CPT

## 2019-06-27 PROCEDURE — 77067 SCR MAMMO BI INCL CAD: CPT | Mod: 26

## 2019-10-16 ENCOUNTER — APPOINTMENT (OUTPATIENT)
Dept: INTERNAL MEDICINE | Facility: CLINIC | Age: 74
End: 2019-10-16
Payer: MEDICARE

## 2019-10-16 VITALS
HEIGHT: 63.5 IN | SYSTOLIC BLOOD PRESSURE: 122 MMHG | DIASTOLIC BLOOD PRESSURE: 60 MMHG | TEMPERATURE: 98.5 F | BODY MASS INDEX: 35 KG/M2 | WEIGHT: 200 LBS | OXYGEN SATURATION: 98 % | HEART RATE: 74 BPM

## 2019-10-16 VITALS
BODY MASS INDEX: 35 KG/M2 | DIASTOLIC BLOOD PRESSURE: 60 MMHG | OXYGEN SATURATION: 98 % | HEIGHT: 63.5 IN | HEART RATE: 74 BPM | SYSTOLIC BLOOD PRESSURE: 122 MMHG | TEMPERATURE: 98.5 F | RESPIRATION RATE: 14 BRPM | WEIGHT: 200 LBS

## 2019-10-16 LAB — HBA1C MFR BLD HPLC: 6.8

## 2019-10-16 PROCEDURE — G0439: CPT

## 2019-10-16 PROCEDURE — 83036 HEMOGLOBIN GLYCOSYLATED A1C: CPT | Mod: QW

## 2019-10-16 NOTE — PHYSICAL EXAM
[No Acute Distress] : no acute distress [Well Nourished] : well nourished [Well Developed] : well developed [Well-Appearing] : well-appearing [PERRL] : pupils equal round and reactive to light [Normal Sclera/Conjunctiva] : normal sclera/conjunctiva [Normal Outer Ear/Nose] : the outer ears and nose were normal in appearance [EOMI] : extraocular movements intact [Normal Oropharynx] : the oropharynx was normal [No Lymphadenopathy] : no lymphadenopathy [No JVD] : no jugular venous distention [Supple] : supple [No Respiratory Distress] : no respiratory distress  [Thyroid Normal, No Nodules] : the thyroid was normal and there were no nodules present [No Accessory Muscle Use] : no accessory muscle use [Clear to Auscultation] : lungs were clear to auscultation bilaterally [Regular Rhythm] : with a regular rhythm [Normal Rate] : normal rate  [Normal S1, S2] : normal S1 and S2 [No Murmur] : no murmur heard [No Carotid Bruits] : no carotid bruits [No Abdominal Bruit] : a ~M bruit was not heard ~T in the abdomen [Pedal Pulses Present] : the pedal pulses are present [No Varicosities] : no varicosities [No Edema] : there was no peripheral edema [No Palpable Aorta] : no palpable aorta [No Extremity Clubbing/Cyanosis] : no extremity clubbing/cyanosis [Soft] : abdomen soft [Non Tender] : non-tender [Non-distended] : non-distended [No Masses] : no abdominal mass palpated [No HSM] : no HSM [Normal Bowel Sounds] : normal bowel sounds [Normal Posterior Cervical Nodes] : no posterior cervical lymphadenopathy [Normal Anterior Cervical Nodes] : no anterior cervical lymphadenopathy [No CVA Tenderness] : no CVA  tenderness [No Spinal Tenderness] : no spinal tenderness [No Joint Swelling] : no joint swelling [Grossly Normal Strength/Tone] : grossly normal strength/tone [No Rash] : no rash [Coordination Grossly Intact] : coordination grossly intact [No Focal Deficits] : no focal deficits [Normal Gait] : normal gait [Deep Tendon Reflexes (DTR)] : deep tendon reflexes were 2+ and symmetric [Normal Affect] : the affect was normal [Normal Insight/Judgement] : insight and judgment were intact

## 2019-10-16 NOTE — ASSESSMENT
[FreeTextEntry1] : Depression/ anxiety - better now smiling more engaged -no side effects \par -continue Remeron 7.5 po qhs 6 months refill \par -Depression screening done at this visit. 15 minute spent in assessment and review.\par -Denies homicidal or suicidal ideas or thoughts\par -Discussed with patient in detail side effects of all medications, hand out given, advise patient to inform family member that he or she is taking the medication and to watch out for any personality changes, to seek medical attention immediately if they notice any difference. \par -Make appointment to see psychiatrist and therapist on a regular basis.\par \par Diabetes-POC AIC - 6.8 stable 3/2019 -  ophtho 7/1/19 reviewed \par -on metformin to 500 po bid , rtc 3 months. Monitor Accu-Cheks daily, - Monitor for signs of hypoglycemia. \par Continue 1800 kcal ADA diet, avoid rice, pasta, sugar, sweet, soda, juices, advised to exercise daily.\par -advised patient to continue walking and exercise.and loose weight \par \par Hyperlipidemia\par Controlled, continue current medications,\par Low-fat diet, avoid red meat, cheese, butter, peanuts and exercise daily for 40 minutes.\par \par Hypertension stable - stress test echo negative , cardio consult reviewed 7/2017 \par -Controlled, continue current medications, low sodium-DASH diet., discussed with patient avoid canned food, process food, fast food, also, advised to take 3-4 servings of fruits and vegetables a day.\par \par morbid obesity BMI 33- discussed with patient avoid carbohydrate and fat, encouraged patient to start exercising 40 minutes daily, lose weight.\par \par insomnia- sleep hygiene reviewed ,no help with melatonin 4 mg increase to 8 mg daily qhs , reduce caffine intake after 7 pm \par -on temazepam as needed at bedtime PRN fall precaution discussed \par \par Cholelithiasis - refused sx \par \par Health Maintenance \par Flu, pneumovax, zostavax, tetanus vaccine - refused \par colonoscope- 2010, FIT 2018 neg, advised colonoscope referral to gi given again \par dexa ordered \par mammo-6/2019 bi trd 1\par PAP- up to date as per pt \par \par

## 2019-10-16 NOTE — HEALTH RISK ASSESSMENT
[Patient reported colonoscopy was normal] : Patient reported colonoscopy was normal [ColonoscopyDate] : 9/1/2010

## 2019-10-16 NOTE — HISTORY OF PRESENT ILLNESS
[de-identified] : sleeping better at night - \par -taking temazepam as needed \par - Remeron 7.5 seem to work needs refill - no side effects \par -- finding problems with every little things , depressed -low mood - sad feeling - not caring for self - lives alone at home since her   2 yrs ago -her daughter works full time - she does not want to go live with her \par - melatonin does not help \par - needs some thing stronger \par -Spouse  -2016 \par - no thoughts of suicide or homicide \par - as per daughter she has noticed her to be sitting lonely and remembering thing again \par - she will have her see psychologist who speaks her language , needs referral \par - cannot do things herself living alone - needs help with BADL - cooking and grooming - 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 \par \par Hypertension- no CP , sob, palpitation or dizzy spells, compliant with medication , saw cardio Had stress and echo that were normal. No other complaints. Began taking asa 81 mg daily. \par \par GERD- acid reflux with certain foods occ. after eating , was see n in  for chest discomfort - her omeprazole was d/christopher and started on pepcid - she is doing well with it , no cp , son palpitations \par \par Obesity-lost 4 lbs since last visit , eating and not exercising \par

## 2019-11-26 ENCOUNTER — MEDICATION RENEWAL (OUTPATIENT)
Age: 74
End: 2019-11-26

## 2019-12-03 ENCOUNTER — MEDICATION RENEWAL (OUTPATIENT)
Age: 74
End: 2019-12-03

## 2019-12-20 ENCOUNTER — MEDICATION RENEWAL (OUTPATIENT)
Age: 74
End: 2019-12-20

## 2019-12-20 RX ORDER — FAMOTIDINE 20 MG/1
20 TABLET, FILM COATED ORAL
Qty: 30 | Refills: 3 | Status: DISCONTINUED | COMMUNITY
Start: 2017-06-19 | End: 2019-12-20

## 2020-06-25 ENCOUNTER — APPOINTMENT (OUTPATIENT)
Dept: INTERNAL MEDICINE | Facility: CLINIC | Age: 75
End: 2020-06-25
Payer: MEDICARE

## 2020-06-25 DIAGNOSIS — G47.00 INSOMNIA, UNSPECIFIED: ICD-10-CM

## 2020-06-25 PROCEDURE — 99213 OFFICE O/P EST LOW 20 MIN: CPT | Mod: 95

## 2020-06-25 NOTE — HISTORY OF PRESENT ILLNESS
[Home] : at home, [unfilled] , at the time of the visit. [Medical Office: ([facility])___] : at the medical office located in  [FreeTextEntry4] : on file  [de-identified] : f/u on ch medical issues \par \par Memory problem- forgetting things were she kept  \par \par Insomnia -  unable to sleep well at night , takes longer time for her to sleep , watches TV before going to bed , no naps in day , sedenatry sitting at home all day.\par \par Diabetic- monitors Accu-Cheks readings- 129-130 mainly, no values in 200, saw Ophth 2019 stable + retinopathy , and glaucoma , did b/l cataract Sx , denies any hypoglycemic episode.\par refused flu vaccine and Pneumovax \par - taking metformin 500 twice a day as her sugars were good , in past was on 1000 bid \par \par Hyperlipidemia- taking cholesterol medication , no muscle pain. \par \par Hypertension- no CP , sob, palpitation or dizzy spells, compliant with medication , saw cardio Had stress and echo that were normal. No other complaints. Began taking asa 81 mg daily. \par \par GERD- acid reflux with certain foods occ. after eating , was see n in  for chest discomfort - her omeprazole was d/christopher and started on pepcid - she is doing well with it , no cp , son palpitations \par \par Obesity-lost 4 lbs since last visit , eating and not exercising \par \par Depression since 2016 spouse death \par - stable now , NO SI/HI \par - on Remeron 7.5 seem to work needs refill - no side effects \par -- finding problems with every little things , depressed -low mood - sad feeling - not caring for self - lives alone at home since her   2 yrs ago -her daughter works full time - she does not want to go live with her \par - melatonin does not help \par \par

## 2020-06-25 NOTE — ASSESSMENT
[FreeTextEntry1] : Diabetes-POC AIC - 6.8 10/2019 \par - get aic \par -on metformin to 500 po bid , rtc 3 months. Monitor Accu-Cheks daily, - Monitor for signs of hypoglycemia. \par Continue 1800 kcal ADA diet, avoid rice, pasta, sugar, sweet, soda, juices, advised to exercise daily.\par -advised patient to continue walking and exercise.and loose weight \par \par Hyperlipidemia\par Controlled, continue current medications,\par Low-fat diet, avoid red meat, cheese, butter, peanuts and exercise daily for 40 minutes.\par \par Hypertension stable - stress test echo negative , cardio consult reviewed 7/2017 \par -Controlled, continue current medications, low sodium-DASH diet., discussed with patient avoid canned food, process food, fast food, also, advised to take 3-4 servings of fruits and vegetables a day.\par \par morbid obesity BMI 33- discussed with patient avoid carbohydrate and fat, encouraged patient to start exercising 40 minutes daily, lose weight.\par \par insomnia- sleep hygiene reviewed , reduce caffine intake after 7 pm \par -rx temazepam as needed at bedtime PRN fall precaution discussed \par \par Depression/ anxiety - better now smiling more engaged -no side effects \par -continue Remeron 7.5 po qhs 6 months refill \par -Depression screening done at this visit. 15 minute spent in assessment and review.\par -Denies homicidal or suicidal ideas or thoughts\par -Discussed with patient in detail side effects of all medications, hand out given, advise patient to inform family member that he or she is taking the medication and to watch out for any personality changes, to seek medical attention immediately if they notice any difference. \par -Make appointment to see psychiatrist and therapist on a regular basis.\par

## 2020-07-29 ENCOUNTER — RX RENEWAL (OUTPATIENT)
Age: 75
End: 2020-07-29

## 2020-09-23 ENCOUNTER — RX RENEWAL (OUTPATIENT)
Age: 75
End: 2020-09-23

## 2020-10-21 ENCOUNTER — RX RENEWAL (OUTPATIENT)
Age: 75
End: 2020-10-21

## 2020-10-23 ENCOUNTER — APPOINTMENT (OUTPATIENT)
Dept: INTERNAL MEDICINE | Facility: CLINIC | Age: 75
End: 2020-10-23
Payer: MEDICARE

## 2020-10-23 VITALS
DIASTOLIC BLOOD PRESSURE: 60 MMHG | WEIGHT: 200 LBS | BODY MASS INDEX: 35 KG/M2 | HEIGHT: 63.5 IN | SYSTOLIC BLOOD PRESSURE: 130 MMHG | OXYGEN SATURATION: 98 % | TEMPERATURE: 98.1 F | HEART RATE: 100 BPM

## 2020-10-23 DIAGNOSIS — Z23 ENCOUNTER FOR IMMUNIZATION: ICD-10-CM

## 2020-10-23 PROCEDURE — G0439: CPT

## 2020-10-23 PROCEDURE — G0442 ANNUAL ALCOHOL SCREEN 15 MIN: CPT | Mod: 59

## 2020-10-23 PROCEDURE — G0444 DEPRESSION SCREEN ANNUAL: CPT | Mod: 59

## 2020-10-23 PROCEDURE — 36415 COLL VENOUS BLD VENIPUNCTURE: CPT

## 2020-10-23 RX ORDER — TEMAZEPAM 7.5 MG/1
7.5 CAPSULE ORAL
Qty: 15 | Refills: 0 | Status: DISCONTINUED | COMMUNITY
Start: 2018-07-31 | End: 2020-10-23

## 2020-10-23 NOTE — HISTORY OF PRESENT ILLNESS
[Other: _____] : [unfilled] [de-identified] : came in 30 minutes late for CPE \par \par restarted HHA - now - stopped due to covid in past - needs shower chair \par \par Memory problem- forgetting things were she kept - getting worse as per daughter - at times anxiety sp when she is alone - looking for increase in hours - has arthritis in hands and lower back with fatigue walking doing work in home \par \par Insomnia - unable to sleep well at night , takes longer time for her to sleep , watches TV before going to bed , no naps in day , sedentary sitting at home all day.\par - insurance will not be covering tamazepam in  \par \par Diabetic- monitors Accu-Cheks readings- 129-130 mainly, no values in 200, saw Ophth 2019 stable + retinopathy , and glaucoma , did b/l cataract Sx , denies any hypoglycemic episode.\par refused flu vaccine and Pneumovax \par - taking metformin 1000 er qd\par \par Hyperlipidemia- taking cholesterol medication , no muscle pain. \par \par Hypertension- no CP , sob, palpitation or dizzy spells, compliant with medication , saw cardio Had stress and echo that were normal. No other complaints. Began taking asa 81 mg daily. \par \par GERD- acid reflux with certain foods occ. after eating , was see n in  for chest discomfort - her omeprazole was d/christopher and started on pepcid - she is doing well with it , no cp , son palpitations \par \par Obesity-lost 4 lbs since last visit , eating and not exercising \par \par Depression since 2016 spouse death \par - stable now , NO SI/HI \par - on Remeron 7.5 seem to work needs refill - no side effects - getting anxiety epsiodes while alone in home \par -- finding problems with every little things , depressed -low mood - sad feeling - not caring for self - lives alone at home since her   2 yrs ago -her daughter works full time - she does not want to go live with her \par - melatonin does not help \par

## 2020-10-23 NOTE — ASSESSMENT
Reviewed discharge instructions and medications with patient and spouse via video  and phone. Questions answered. Patient discharged to home with wife, discharge instructions, medications (Keflex, Oxycodone, and Senna), walker, and belongings at this time. Patient stated he had adequate supply of home medications at home. Instructed to scheduled orthopedic follow up appointment via . Pt and wife verbalized understanding.   [FreeTextEntry1] : \par Diabetes-\par - get aic , lipids , UA \par -on metformin 1000 ER qd , rtc 3 months. Monitor Accu-Cheks daily, - Monitor for signs of hypoglycemia. \par Continue 1800 kcal ADA diet, avoid rice, pasta, sugar, sweet, soda, juices, advised to exercise daily.\par -advised patient to continue walking and exercise.and loose weight \par \par Hyperlipidemia\par Controlled, continue current medications,\par Low-fat diet, avoid red meat, cheese, butter, peanuts and exercise daily for 40 minutes.\par \par Hypertension stable - stress test echo negative , cardio consult reviewed 7/2017 - referral for cardio - c/o palpitations possible anxiety episodes \par -Controlled, continue current medications, low sodium-DASH diet., discussed with patient avoid canned food, process food, fast food, also, advised to take 3-4 servings of fruits and vegetables a day.\par \par morbid obesity BMI 34- discussed with patient avoid carbohydrate and fat, encouraged patient to start exercising 40 minutes daily, lose weight.\par \par insomnia- sleep hygiene reviewed , reduce caffine intake after 7 pm \par \par Depression/ anxiety - better now smiling more engaged -no side effects \par -change to Remeron 15 mg po qhs 6 months refill \par -Depression screening done at this visit. 15 minute spent in assessment and review.\par -Denies homicidal or suicidal ideas or thoughts\par -Discussed with patient in detail side effects of all medications, hand out given, advise patient to inform family member that he or she is taking the medication and to watch out for any personality changes, to seek medical attention immediately if they notice any difference. \par -Make appointment to see psychiatrist and therapist on a regular basis.\par \par Health Maintenance \par Flu, pneumovax, zostavax, tetanus vaccine - refused \par colonoscope- 2010, FIT 2018 neg, advised colonoscope referral to gi given again \par dexa ordered again\par mammo-6/2019 bi rad 1 referral given \par PAP- up to date as per pt \par \par

## 2020-10-23 NOTE — HEALTH RISK ASSESSMENT
[Good] : ~his/her~  mood as  good [No] : No [No falls in past year] : Patient reported no falls in the past year [0] : 2) Feeling down, depressed, or hopeless: Not at all (0) [Alone] : lives alone [Retired] : retired [] :  [] : No [de-identified] : walking  [[id number]] : 0 [Reports changes in hearing] : Reports no changes in hearing [Reports changes in vision] : Reports no changes in vision [Reports changes in dental health] : Reports no changes in dental health

## 2020-10-26 LAB
25(OH)D3 SERPL-MCNC: 44.3 NG/ML
ALBUMIN SERPL ELPH-MCNC: 4.2 G/DL
ALP BLD-CCNC: 97 U/L
ALT SERPL-CCNC: 11 U/L
ANION GAP SERPL CALC-SCNC: 15 MMOL/L
APPEARANCE: CLEAR
AST SERPL-CCNC: 18 U/L
BASOPHILS # BLD AUTO: 0.04 K/UL
BASOPHILS NFR BLD AUTO: 0.4 %
BILIRUB SERPL-MCNC: 0.4 MG/DL
BILIRUBIN URINE: NEGATIVE
BLOOD URINE: NEGATIVE
BUN SERPL-MCNC: 15 MG/DL
CALCIUM SERPL-MCNC: 10.1 MG/DL
CHLORIDE SERPL-SCNC: 102 MMOL/L
CHOLEST SERPL-MCNC: 132 MG/DL
CO2 SERPL-SCNC: 26 MMOL/L
COLOR: NORMAL
CREAT SERPL-MCNC: 0.84 MG/DL
EOSINOPHIL # BLD AUTO: 0.09 K/UL
EOSINOPHIL NFR BLD AUTO: 0.9 %
ESTIMATED AVERAGE GLUCOSE: 151 MG/DL
GLUCOSE QUALITATIVE U: NORMAL
GLUCOSE SERPL-MCNC: 201 MG/DL
HBA1C MFR BLD HPLC: 6.9 %
HCT VFR BLD CALC: 37.4 %
HDLC SERPL-MCNC: 49 MG/DL
HGB BLD-MCNC: 11.6 G/DL
IMM GRANULOCYTES NFR BLD AUTO: 0.3 %
KETONES URINE: NEGATIVE
LDLC SERPL CALC-MCNC: 68 MG/DL
LEUKOCYTE ESTERASE URINE: NEGATIVE
LYMPHOCYTES # BLD AUTO: 3.19 K/UL
LYMPHOCYTES NFR BLD AUTO: 31.3 %
MAN DIFF?: NORMAL
MCHC RBC-ENTMCNC: 29.8 PG
MCHC RBC-ENTMCNC: 31 GM/DL
MCV RBC AUTO: 96.1 FL
MONOCYTES # BLD AUTO: 0.63 K/UL
MONOCYTES NFR BLD AUTO: 6.2 %
NEUTROPHILS # BLD AUTO: 6.22 K/UL
NEUTROPHILS NFR BLD AUTO: 60.9 %
NITRITE URINE: NEGATIVE
NONHDLC SERPL-MCNC: 82 MG/DL
PH URINE: 7.5
PLATELET # BLD AUTO: 370 K/UL
POTASSIUM SERPL-SCNC: 4.3 MMOL/L
PROT SERPL-MCNC: 7 G/DL
PROTEIN URINE: NEGATIVE
RBC # BLD: 3.89 M/UL
RBC # FLD: 13.3 %
SODIUM SERPL-SCNC: 142 MMOL/L
SPECIFIC GRAVITY URINE: 1.02
TRIGL SERPL-MCNC: 74 MG/DL
TSH SERPL-ACNC: 1.59 UIU/ML
UROBILINOGEN URINE: NORMAL
VIT B12 SERPL-MCNC: >2000 PG/ML
WBC # FLD AUTO: 10.2 K/UL

## 2020-11-17 ENCOUNTER — APPOINTMENT (OUTPATIENT)
Dept: MAMMOGRAPHY | Facility: CLINIC | Age: 75
End: 2020-11-17
Payer: MEDICARE

## 2020-11-17 ENCOUNTER — OUTPATIENT (OUTPATIENT)
Dept: OUTPATIENT SERVICES | Facility: HOSPITAL | Age: 75
LOS: 1 days | End: 2020-11-17
Payer: MEDICARE

## 2020-11-17 ENCOUNTER — RESULT REVIEW (OUTPATIENT)
Age: 75
End: 2020-11-17

## 2020-11-17 DIAGNOSIS — Z00.00 ENCOUNTER FOR GENERAL ADULT MEDICAL EXAMINATION WITHOUT ABNORMAL FINDINGS: ICD-10-CM

## 2020-11-17 PROCEDURE — 77063 BREAST TOMOSYNTHESIS BI: CPT

## 2020-11-17 PROCEDURE — 77067 SCR MAMMO BI INCL CAD: CPT | Mod: 26

## 2020-11-17 PROCEDURE — 77063 BREAST TOMOSYNTHESIS BI: CPT | Mod: 26

## 2020-11-17 PROCEDURE — 77067 SCR MAMMO BI INCL CAD: CPT

## 2020-11-24 ENCOUNTER — OUTPATIENT (OUTPATIENT)
Dept: OUTPATIENT SERVICES | Facility: HOSPITAL | Age: 75
LOS: 1 days | End: 2020-11-24
Payer: MEDICARE

## 2020-11-24 ENCOUNTER — APPOINTMENT (OUTPATIENT)
Dept: RADIOLOGY | Facility: CLINIC | Age: 75
End: 2020-11-24
Payer: MEDICARE

## 2020-11-24 DIAGNOSIS — Z00.8 ENCOUNTER FOR OTHER GENERAL EXAMINATION: ICD-10-CM

## 2020-11-24 PROCEDURE — 77080 DXA BONE DENSITY AXIAL: CPT | Mod: 26

## 2020-11-24 PROCEDURE — 77080 DXA BONE DENSITY AXIAL: CPT

## 2021-01-14 ENCOUNTER — RX RENEWAL (OUTPATIENT)
Age: 76
End: 2021-01-14

## 2021-02-22 ENCOUNTER — APPOINTMENT (OUTPATIENT)
Dept: INTERNAL MEDICINE | Facility: CLINIC | Age: 76
End: 2021-02-22
Payer: MEDICARE

## 2021-02-22 VITALS
HEIGHT: 63 IN | BODY MASS INDEX: 35.79 KG/M2 | TEMPERATURE: 97.8 F | SYSTOLIC BLOOD PRESSURE: 130 MMHG | HEART RATE: 98 BPM | WEIGHT: 202 LBS | DIASTOLIC BLOOD PRESSURE: 84 MMHG | OXYGEN SATURATION: 95 %

## 2021-02-22 DIAGNOSIS — G31.84 MILD COGNITIVE IMPAIRMENT, SO STATED: ICD-10-CM

## 2021-02-22 PROCEDURE — 99214 OFFICE O/P EST MOD 30 MIN: CPT | Mod: 25

## 2021-02-22 PROCEDURE — 36415 COLL VENOUS BLD VENIPUNCTURE: CPT

## 2021-02-22 NOTE — HEALTH RISK ASSESSMENT
[No falls in past year] : Patient reported no falls in the past year [0] : 2) Feeling down, depressed, or hopeless: Not at all (0) [(PHQ-2) Unable to screen] : PHQ-2: unable to screen [[id number]] : 0

## 2021-02-22 NOTE — ASSESSMENT
Noted. [FreeTextEntry1] : Daughter -Line Mayo -[phone]  Pt wishes her daughter to be her Health care proxy - she wants CPR -Health care prxy forms given pt will sign and fax us back \par \par Diabetes-\par - get aic ,UA \par -on metformin 1000 ER qd , rtc 3 months. Monitor Accu-Cheks daily, - Monitor for signs of hypoglycemia. \par Continue 1800 kcal ADA diet, avoid rice, pasta, sugar, sweet, soda, juices, advised to exercise daily.\par -advised patient to continue walking and exercise.and loose weight \par \par Hyperlipidemia\par Controlled, continue current medications,\par Low-fat diet, avoid red meat, cheese, butter, peanuts and exercise daily for 40 minutes.\par \par Hypertension stable - stress test echo negative , cardio consult reviewed 7/2017 - referral for cardio - c/o palpitations possible anxiety episodes \par -Controlled, continue current medications, low sodium-DASH diet., discussed with patient avoid canned food, process food, fast food, also, advised to take 3-4 servings of fruits and vegetables a day.\par \par morbid obesity BMI 34- discussed with patient avoid carbohydrate and fat, encouraged patient to start exercising 40 minutes daily, lose weight.\par \par insomnia- sleep hygiene reviewed , reduce caffine intake after 7 pm \par \par mild cog impairment - trial of Nemenda 5 qhs - encouraged social interaction and keeping self busy and increase physical activity / exercise \par \par Depression/ anxiety - better now smiling more engaged -no side effects \par -change to Remeron 15 mg po qhs 6 months refill \par -Depression screening done at this visit. 15 minute spent in assessment and review.\par -Denies homicidal or suicidal ideas or thoughts\par -Discussed with patient in detail side effects of all medications, hand out given, advise patient to inform family member that he or she is taking the medication and to watch out for any personality changes, to seek medical attention immediately if they notice any difference. \par -Make appointment to see psychiatrist and therapist on a regular basis.\par \par Health Maintenance \par Flu, pneumovax, zostavax, tetanus vaccine - refused \par colonoscope- 2010, FIT 2018 neg, advised colonoscope referral to gi given again \par dexa ordered again\par mammo-6/2019 bi rad 1 referral given \par PAP- up to date as per pt \par COVID Vaccine: discussed risk and benefits of COVID vaccine; given risk factors for severe COVID disease, I recommend patient receive COVID vaccine; asked them to consider protective effects and balance with minimal known side effects from the vaccine at this time. \par \par COVID Vaccine referrals: \par must go to web site: [email or web address] or or [email or web address]\par or Text "myturn" to [phone]\par or call [phone]\par \par \par

## 2021-02-22 NOTE — HISTORY OF PRESENT ILLNESS
[Other: _____] : [unfilled] [de-identified] : f/u on ch medical issues \par \par has HHA - now gets 4 hrs only requesting more hrs  \par \par Memory problem- forgetting things were she kept - getting worse as per daughter - at times anxiety sp when she is alone - looking for increase in hours - has arthritis in hands and lower back with fatigue walking doing work in home \par \par Insomnia - unable to sleep well at night , takes longer time for her to sleep , watches TV before going to bed , no naps in day , sedentary sitting at home all day.\par - insurance will not be covering tamazepam in  \par \par Diabetic- monitors Accu-Cheks readings- 129-130 mainly, no values in 200, saw Oph2019 stable + retinopathy , and glaucoma , did b/l cataract Sx , denies any hypoglycemic episode.\par refused flu vaccine and Pneumovax \par - taking metformin 1000 er qd\par \par Hyperlipidemia- taking cholesterol medication , no muscle pain. \par \par Hypertension- no CP , sob, palpitation or dizzy spells, compliant with medication , saw cardio Had stress and echo that were normal. No other complaints. Began taking asa 81 mg daily. \par \par GERD- acid reflux with certain foods occ. after eating , was see n in  for chest discomfort - her omeprazole was d/christopher and started on pepcid - she is doing well with it , no cp , son palpitations \par \par Obesity-lost 4 lbs since last visit , eating and not exercising \par \par Depression since 2016 spouse death \par - stable now , NO SI/HI \par - on Remeron 7.5 seem to work needs refill - no side effects - getting anxiety epsiodes while alone in home \par -- finding problems with every little things , depressed -low mood - sad feeling - not caring for self - lives alone at home since her   3 yrs ago -her daughter works full time - she does not want to go live with her \par - melatonin does not help \par

## 2021-02-24 LAB
ANION GAP SERPL CALC-SCNC: 12 MMOL/L
BUN SERPL-MCNC: 12 MG/DL
CALCIUM SERPL-MCNC: 10 MG/DL
CHLORIDE SERPL-SCNC: 103 MMOL/L
CO2 SERPL-SCNC: 26 MMOL/L
CREAT SERPL-MCNC: 0.84 MG/DL
CREAT SPEC-SCNC: 30 MG/DL
ESTIMATED AVERAGE GLUCOSE: 160 MG/DL
GLUCOSE SERPL-MCNC: 200 MG/DL
HBA1C MFR BLD HPLC: 7.2 %
MICROALBUMIN 24H UR DL<=1MG/L-MCNC: <1.2 MG/DL
MICROALBUMIN/CREAT 24H UR-RTO: NORMAL MG/G
POTASSIUM SERPL-SCNC: 4 MMOL/L
SODIUM SERPL-SCNC: 140 MMOL/L

## 2021-03-08 ENCOUNTER — RX RENEWAL (OUTPATIENT)
Age: 76
End: 2021-03-08

## 2021-03-24 DIAGNOSIS — R32 UNSPECIFIED URINARY INCONTINENCE: ICD-10-CM

## 2021-06-21 ENCOUNTER — TRANSCRIPTION ENCOUNTER (OUTPATIENT)
Age: 76
End: 2021-06-21

## 2021-06-21 ENCOUNTER — NON-APPOINTMENT (OUTPATIENT)
Age: 76
End: 2021-06-21

## 2021-06-21 ENCOUNTER — APPOINTMENT (OUTPATIENT)
Dept: INTERNAL MEDICINE | Facility: CLINIC | Age: 76
End: 2021-06-21
Payer: MEDICARE

## 2021-06-21 VITALS
BODY MASS INDEX: 35.43 KG/M2 | TEMPERATURE: 98.4 F | HEART RATE: 105 BPM | DIASTOLIC BLOOD PRESSURE: 58 MMHG | OXYGEN SATURATION: 97 % | SYSTOLIC BLOOD PRESSURE: 112 MMHG | WEIGHT: 200 LBS

## 2021-06-21 PROCEDURE — 83036 HEMOGLOBIN GLYCOSYLATED A1C: CPT | Mod: QW

## 2021-06-21 PROCEDURE — 99214 OFFICE O/P EST MOD 30 MIN: CPT | Mod: 25

## 2021-06-21 NOTE — ASSESSMENT
[FreeTextEntry1] : Daughter -Line Mayo -[phone] Pt wishes her daughter to be her Health care proxy - she wants CPR -Health care prxy forms given pt will sign and fax us back again \par \par Diabetes-poc aic- 6/21/21- 7.6 went up from 7.2 \par Chip has appt 6/23/21 \par -on metformin 1000 ER qd , rtc 3 months. Monitor Accu-Cheks daily, - Monitor for signs of hypoglycemia. \par Continue 1800 kcal ADA diet, avoid rice, pasta, sugar, sweet, soda, juices, advised to exercise daily.\par -advised patient to continue walking and exercise.and loose weight \par \par Hyperlipidemia\par Controlled, continue current medications,\par Low-fat diet, avoid red meat, cheese, butter, peanuts and exercise daily for 40 minutes.\par \par Hypertension stable - stress test echo negative , cardio consult reviewed 7/2017 - referral for cardio - c/o palpitations possible anxiety episodes \par -Controlled, continue current medications, low sodium-DASH diet., discussed with patient avoid canned food, process food, fast food, also, advised to take 3-4 servings of fruits and vegetables a day.\par \par morbid obesity BMI 34- -> 35 discussed with patient avoid carbohydrate and fat, encouraged patient to start exercising 40 minutes daily, lose weight.\par \par insomnia- sleep hygiene reviewed , reduce caffine intake after 7 pm \par \par mild cog impairment - on Nemenda 5 qhs - encouraged social interaction and keeping self busy and increase physical activity / exercise \par \par Depression/ anxiety - better now smiling more engaged -no side effects \par -stable Remeron 15 mg po qhs 6 months refill - no side effects \par -Depression screening done at this visit. 15 minute spent in assessment and review.\par -Denies homicidal or suicidal ideas or thoughts\par -Discussed with patient in detail side effects of all medications, hand out given, advise patient to inform family member that he or she is taking the medication and to watch out for any personality changes, to seek medical attention immediately if they notice any difference. \par -Make appointment to see psychiatrist and therapist on a regular basis.\par \par Health Maintenance \par Flu, pneumovax, zostavax, tetanus vaccine - refused \par colonoscope- 2010, FIT 2018 neg, refused colonoscope , FIT ordered - advised colonoscope referral to gi given again \par dexa ordered again\par mammo-6/2019 bi rad 1 referral given \par PAP- up to date as per pt \par COVID Vaccine:[employer] 4/18/21, 5//8/21

## 2021-06-21 NOTE — HISTORY OF PRESENT ILLNESS
[Other: _____] : [unfilled] [de-identified] : f/u on ch medical issues \par \par has HHA - now gets 4 hrs only requesting more hrs \par \par Memory problem- forgetting things were she kept - getting worse as per daughter - at times anxiety sp when she is alone - looking for increase in hours - has arthritis in hands and lower back with fatigue walking doing work in home \par \par Insomnia - unable to sleep well at night , takes longer time for her to sleep , watches TV before going to bed , no naps in day , sedentary sitting at home all day.\par - insurance will not be covering tamazepam in  \par \par Diabetic- monitors Accu-Cheks readings- 129-130 mainly, no values in 200, saw Ophth 2019 stable + retinopathy , and glaucoma , did b/l cataract Sx , denies any hypoglycemic episode.\par refused flu vaccine and Pneumovax \par - taking metformin 1000 er qd\par has appt with ophtho 21 \par \par Hyperlipidemia- taking cholesterol medication , no muscle pain. \par \par Hypertension- no CP , sob, palpitation or dizzy spells, compliant with medication , saw cardio Had stress and echo that were normal. No other complaints. Began taking asa 81 mg daily. \par \par GERD- acid reflux with certain foods occ. after eating , was see n in  for chest discomfort - her omeprazole was d/christopher and started on pepcid - she is doing well with it , no cp , son palpitations \par \par Obesity-lost 4 lbs since last visit , eating and not exercising \par \par Depression since 2016 spouse death \par - stable now , NO SI/HI \par - on Remeron 7.5 seem to work needs refill - no side effects - getting anxiety epsiodes while alone in home \par -- finding problems with every little things , depressed -low mood - sad feeling - not caring for self - lives alone at home since her   3 yrs ago -her daughter works full time - she does not want to go live with her \par - melatonin does not help \par  \par

## 2021-09-24 ENCOUNTER — RX RENEWAL (OUTPATIENT)
Age: 76
End: 2021-09-24

## 2021-10-19 ENCOUNTER — RX RENEWAL (OUTPATIENT)
Age: 76
End: 2021-10-19

## 2021-12-03 ENCOUNTER — APPOINTMENT (OUTPATIENT)
Dept: INTERNAL MEDICINE | Facility: CLINIC | Age: 76
End: 2021-12-03
Payer: MEDICARE

## 2021-12-03 VITALS
BODY MASS INDEX: 35.43 KG/M2 | OXYGEN SATURATION: 98 % | WEIGHT: 200 LBS | DIASTOLIC BLOOD PRESSURE: 58 MMHG | HEART RATE: 96 BPM | TEMPERATURE: 98.3 F | SYSTOLIC BLOOD PRESSURE: 122 MMHG

## 2021-12-03 PROCEDURE — 36415 COLL VENOUS BLD VENIPUNCTURE: CPT

## 2021-12-03 PROCEDURE — G0439: CPT

## 2021-12-03 PROCEDURE — G0442 ANNUAL ALCOHOL SCREEN 15 MIN: CPT | Mod: 59

## 2021-12-03 PROCEDURE — G0444 DEPRESSION SCREEN ANNUAL: CPT

## 2021-12-03 RX ORDER — OMEPRAZOLE, SODIUM BICARBONATE 40; 1100 MG/1; MG/1
40-1100 CAPSULE ORAL
Qty: 30 | Refills: 1 | Status: DISCONTINUED | COMMUNITY
Start: 2019-12-20 | End: 2021-12-03

## 2021-12-03 NOTE — ASSESSMENT
[FreeTextEntry1] : Daughter -Line Mayo -[phone] Pt wishes her daughter to be her Health care proxy - she wants CPR -Health care prxy forms given pt will sign and fax us back again \par \par Diabetes-get AIC \par Chip saw 6/23/21 \par -on metformin 1000 ER qd , rtc 3 months. Monitor Accu-Cheks daily, - Monitor for signs of hypoglycemia. \par Continue 1800 kcal ADA diet, avoid rice, pasta, sugar, sweet, soda, juices, advised to exercise daily.\par -advised patient to continue walking and exercise.and loose weight \par \par Hyperlipidemia\par Controlled, change to Atorvastatin 20 qhs _ ASCVD risk 20.96 % - LFT ad lipids next visit \par Low-fat diet, avoid red meat, cheese, butter, peanuts and exercise daily for 40 minutes.\par \par Hypertension stable - stress test echo negative , cardio consult reviewed 7/2017 \par -Controlled, continue current medications, low sodium-DASH diet., discussed with patient avoid canned food, process food, fast food, also, advised to take 3-4 servings of fruits and vegetables a day.\par \par morbid obesity BMI 34- -> 35 discussed with patient avoid carbohydrate and fat, encouraged patient to start exercising 40 minutes daily, lose weight.\par - advised GLP-1/ 2  inhibitor - InvoKana 100 qd  - pt deferred will reaserch and let me know next visit  \par \par insomnia- sleep hygiene reviewed , reduce caffine intake after 7 pm \par \par mild cog impairment - on Nemenda 5 qhs - encouraged social interaction and keeping self busy and increase physical activity / exercise \par \par Depression/ anxiety - better now smiling more engaged -no side effects \par -stable Remeron 15 mg po qhs 6 months refill - no side effects \par -Depression screening done at this visit. 15 minute spent in assessment and review.\par -Denies homicidal or suicidal ideas or thoughts\par -Discussed with patient in detail side effects of all medications, hand out given, advise patient to inform family member that he or she is taking the medication and to watch out for any personality changes, to seek medical attention immediately if they notice any difference. \par -Make appointment to see psychiatrist and therapist on a regular basis.\par \par Osteopenia\par - start Calcium 500-600 mg daily \par -start vitamin d 1000 units daily \par - do weight bearing exercises: walking with weights, stair climbing , weight training , jogging, aerobics etc \par -repeat test 2 yrs for a f/u\par \par Health Maintenance \par Flu, pneumovax, zostavax, tetanus vaccine - refused \par colonoscope- 2010, FIT 2018 neg, refused colonoscope , FIT ordered - advised colonoscope referral to gi given again \par dexa - 11/2020 Osteopenia \par mammo11/2020 Bi rad 1 referral given \par PAP- up to date as per pt \par COVID Vaccine:Pfizer 4/18/21, 5//8/21. Booster advised \par \par

## 2021-12-06 LAB
25(OH)D3 SERPL-MCNC: 38.1 NG/ML
ALBUMIN SERPL ELPH-MCNC: 4.6 G/DL
ALP BLD-CCNC: 89 U/L
ALT SERPL-CCNC: 12 U/L
ANION GAP SERPL CALC-SCNC: 13 MMOL/L
APPEARANCE: CLEAR
AST SERPL-CCNC: 14 U/L
BASOPHILS # BLD AUTO: 0.04 K/UL
BASOPHILS NFR BLD AUTO: 0.4 %
BILIRUB SERPL-MCNC: 0.3 MG/DL
BILIRUBIN URINE: NEGATIVE
BLOOD URINE: NEGATIVE
BUN SERPL-MCNC: 22 MG/DL
CALCIUM SERPL-MCNC: 10.1 MG/DL
CHLORIDE SERPL-SCNC: 103 MMOL/L
CHOLEST SERPL-MCNC: 134 MG/DL
CO2 SERPL-SCNC: 25 MMOL/L
COLOR: NORMAL
CREAT SERPL-MCNC: 0.91 MG/DL
EOSINOPHIL # BLD AUTO: 0.07 K/UL
EOSINOPHIL NFR BLD AUTO: 0.7 %
ESTIMATED AVERAGE GLUCOSE: 157 MG/DL
GLUCOSE QUALITATIVE U: NEGATIVE
GLUCOSE SERPL-MCNC: 167 MG/DL
HBA1C MFR BLD HPLC: 7.1 %
HCT VFR BLD CALC: 35.7 %
HDLC SERPL-MCNC: 53 MG/DL
HGB BLD-MCNC: 11.7 G/DL
IMM GRANULOCYTES NFR BLD AUTO: 0.2 %
KETONES URINE: NEGATIVE
LDLC SERPL CALC-MCNC: 68 MG/DL
LEUKOCYTE ESTERASE URINE: NEGATIVE
LYMPHOCYTES # BLD AUTO: 3.37 K/UL
LYMPHOCYTES NFR BLD AUTO: 35.3 %
MAN DIFF?: NORMAL
MCHC RBC-ENTMCNC: 30.5 PG
MCHC RBC-ENTMCNC: 32.8 GM/DL
MCV RBC AUTO: 93 FL
MONOCYTES # BLD AUTO: 0.6 K/UL
MONOCYTES NFR BLD AUTO: 6.3 %
NEUTROPHILS # BLD AUTO: 5.45 K/UL
NEUTROPHILS NFR BLD AUTO: 57.1 %
NITRITE URINE: NEGATIVE
NONHDLC SERPL-MCNC: 81 MG/DL
PH URINE: 7
PLATELET # BLD AUTO: 358 K/UL
POTASSIUM SERPL-SCNC: 4.2 MMOL/L
PROT SERPL-MCNC: 7.3 G/DL
PROTEIN URINE: NEGATIVE
RBC # BLD: 3.84 M/UL
RBC # FLD: 13.1 %
SODIUM SERPL-SCNC: 141 MMOL/L
SPECIFIC GRAVITY URINE: 1.01
TRIGL SERPL-MCNC: 64 MG/DL
TSH SERPL-ACNC: 1.57 UIU/ML
UROBILINOGEN URINE: NORMAL
WBC # FLD AUTO: 9.55 K/UL

## 2021-12-13 LAB — HEMOCCULT STL QL IA: NEGATIVE

## 2021-12-14 ENCOUNTER — RX RENEWAL (OUTPATIENT)
Age: 76
End: 2021-12-14

## 2021-12-28 ENCOUNTER — OUTPATIENT (OUTPATIENT)
Dept: OUTPATIENT SERVICES | Facility: HOSPITAL | Age: 76
LOS: 1 days | End: 2021-12-28

## 2021-12-28 DIAGNOSIS — Z00.8 ENCOUNTER FOR OTHER GENERAL EXAMINATION: ICD-10-CM

## 2022-01-28 ENCOUNTER — RESULT REVIEW (OUTPATIENT)
Age: 77
End: 2022-01-28

## 2022-01-28 ENCOUNTER — APPOINTMENT (OUTPATIENT)
Dept: MAMMOGRAPHY | Facility: IMAGING CENTER | Age: 77
End: 2022-01-28
Payer: MEDICARE

## 2022-01-28 ENCOUNTER — OUTPATIENT (OUTPATIENT)
Dept: OUTPATIENT SERVICES | Facility: HOSPITAL | Age: 77
LOS: 1 days | End: 2022-01-28
Payer: MEDICARE

## 2022-01-28 DIAGNOSIS — Z00.00 ENCOUNTER FOR GENERAL ADULT MEDICAL EXAMINATION WITHOUT ABNORMAL FINDINGS: ICD-10-CM

## 2022-01-28 PROCEDURE — 77067 SCR MAMMO BI INCL CAD: CPT

## 2022-01-28 PROCEDURE — 77067 SCR MAMMO BI INCL CAD: CPT | Mod: 26

## 2022-01-28 PROCEDURE — 77063 BREAST TOMOSYNTHESIS BI: CPT | Mod: 26

## 2022-01-28 PROCEDURE — 77063 BREAST TOMOSYNTHESIS BI: CPT

## 2022-04-05 ENCOUNTER — RX RENEWAL (OUTPATIENT)
Age: 77
End: 2022-04-05

## 2022-04-12 ENCOUNTER — APPOINTMENT (OUTPATIENT)
Dept: INTERNAL MEDICINE | Facility: CLINIC | Age: 77
End: 2022-04-12
Payer: MEDICARE

## 2022-04-12 ENCOUNTER — RESULT CHARGE (OUTPATIENT)
Age: 77
End: 2022-04-12

## 2022-04-12 VITALS
BODY MASS INDEX: 35.44 KG/M2 | WEIGHT: 200 LBS | OXYGEN SATURATION: 97 % | HEART RATE: 91 BPM | HEIGHT: 63 IN | DIASTOLIC BLOOD PRESSURE: 78 MMHG | TEMPERATURE: 98.2 F | SYSTOLIC BLOOD PRESSURE: 120 MMHG

## 2022-04-12 DIAGNOSIS — R01.1 CARDIAC MURMUR, UNSPECIFIED: ICD-10-CM

## 2022-04-12 PROCEDURE — 83036 HEMOGLOBIN GLYCOSYLATED A1C: CPT | Mod: QW

## 2022-04-12 PROCEDURE — 99214 OFFICE O/P EST MOD 30 MIN: CPT

## 2022-04-12 NOTE — HISTORY OF PRESENT ILLNESS
[Other: _____] : [unfilled] [de-identified] : seen for F/u on ch medical issue - translated by Grand daughter \par \par has HHA - now gets 4 hrs only requesting more hrs \par \par Memory problem- forgetting things were she kept - getting worse as per daughter - at times anxiety sp when she is alone - looking for increase in hours - has arthritis in hands and lower back with fatigue walking doing work in home \par \par Insomnia - unable to sleep well at night , takes longer time for her to sleep , watches TV before going to bed , no naps in day , sedentary sitting at home all day.\par - insurance will not be covering tamazepam in  \par \par Diabetic- monitors Accu-Cheks readings- 129-130 mainly, no values in 200, saw Ophth 2021 stable + retinopathy , and glaucoma , did b/l cataract Sx , denies any hypoglycemic episode. has not doen lazer rx for 2 ysr - she has to f/u on it pending \par refused flu vaccine and Pneumovax \par - taking metformin 1000 er qd\par saw ophtho 21\par \par Hyperlipidemia- taking cholesterol medication , no muscle pain. \par \par Hypertension- no CP , sob, palpitation or dizzy spells, compliant with medication , saw cardio Had stress and echo that were normal. No other complaints. Began taking asa 81 mg daily. \par \par GERD- acid reflux with certain foods occ. after eating , was see n in  for chest discomfort - her omeprazole was d/christopher and started on pepcid - she is doing well with it , no cp , son palpitations \par \par Obesity-lost 4 lbs since last visit , eating and not exercising \par \par Depression since 2016 spouse death \par - stable now , NO SI/HI \par - on Remeron 7.5 seem to work needs refill - no side effects - getting anxiety epsiodes while alone in home \par -- finding problems with every little things , depressed -low mood - sad feeling - not caring for self - lives alone at home since her   3 yrs ago -her daughter works full time - she does not want to go live with her \par - melatonin does not help \par

## 2022-04-12 NOTE — ASSESSMENT
[FreeTextEntry1] : \par Daughter -Line Mayo -[phone] Pt wishes her daughter to be her Health care proxy - she wants CPR -Health care prxy forms given pt will sign and fax us back again \par \par Diabetes-POC AIC \par AIC 7.1 12/21 \par Chip saw 6/23/21 add invokana 100 qd \par -on metformin 1000 ER qd , rtc 3 months. Monitor Accu-Cheks daily, - Monitor for signs of hypoglycemia. \par Continue 1800 kcal ADA diet, avoid rice, pasta, sugar, sweet, soda, juices, advised to exercise daily.\par -advised patient to continue walking and exercise.and loose weight \par \par Hyperlipidemia\par Controlled, change to Atorvastatin 20 qhs 12/21  _ ASCVD risk 20.96 % - LFT ad lipids today \par Low-fat diet, avoid red meat, cheese, butter, peanuts and exercise daily for 40 minutes.\par \par Hypertension stable - stress test echo negative , cardio consult reviewed 7/2017 \par -Controlled, continue current medications, low sodium-DASH diet., discussed with patient avoid canned food, process food, fast food, also, advised to take 3-4 servings of fruits and vegetables a day.\par \par morbid obesity BMI 34- -> 35 discussed with patient avoid carbohydrate and fat, encouraged patient to start exercising 40 minutes daily, lose weight.\par - advised GLP-1/ 2 inhibitor - InvoKana 100 qd - pt deferred will research and let me know next visit \par \par insomnia- sleep hygiene reviewed , reduce caffine intake after 7 pm \par \par mild cog impairment - on Nemenda 5 qhs - encouraged social interaction and keeping self busy and increase physical activity / exercise \par \par Depression/ anxiety - better now smiling more engaged -no side effects \par -stable Remeron 15 mg po qhs 6 months refill - no side effects \par -Depression screening done at this visit. 15 minute spent in assessment and review.\par -Denies homicidal or suicidal ideas or thoughts\par -Discussed with patient in detail side effects of all medications, hand out given, advise patient to inform family member that he or she is taking the medication and to watch out for any personality changes, to seek medical attention immediately if they notice any difference. \par -Make appointment to see psychiatrist and therapist on a regular basis.\par \par Osteopenia\par - start Calcium 500-600 mg daily \par -start vitamin d 1000 units daily \par - do weight bearing exercises: walking with weights, stair climbing , weight training , jogging, aerobics etc \par -repeat test 2 yrs for a f/u\par \par Health Maintenance \par Flu, pneumovax, zostavax, tetanus vaccine - refused \par colonoscope- 2010, FIT 2018 neg, refused colonoscope , FIT ordered - advised colonoscope referral to gi given again \par dexa - 11/2020 Osteopenia \par mammo- 1/2022 bi rad 2  \par PAP- up to date as per pt \par COVID Vaccine:Pfizer 4/18/21, 5//8/21. Booster- 1/10/22

## 2022-04-12 NOTE — PHYSICAL EXAM
[Normal] : soft, non-tender, non-distended, no masses palpated, no HSM and normal bowel sounds [de-identified] : systolic murmur

## 2022-04-13 LAB
ALBUMIN SERPL ELPH-MCNC: 4.1 G/DL
ALP BLD-CCNC: 97 U/L
ALT SERPL-CCNC: 11 U/L
ANION GAP SERPL CALC-SCNC: 13 MMOL/L
AST SERPL-CCNC: 15 U/L
BILIRUB SERPL-MCNC: 0.2 MG/DL
BUN SERPL-MCNC: 14 MG/DL
CALCIUM SERPL-MCNC: 10.1 MG/DL
CHLORIDE SERPL-SCNC: 103 MMOL/L
CHOLEST SERPL-MCNC: 128 MG/DL
CO2 SERPL-SCNC: 25 MMOL/L
CREAT SERPL-MCNC: 0.81 MG/DL
EGFR: 75 ML/MIN/1.73M2
GLUCOSE SERPL-MCNC: 195 MG/DL
HDLC SERPL-MCNC: 50 MG/DL
LDLC SERPL CALC-MCNC: 68 MG/DL
NONHDLC SERPL-MCNC: 79 MG/DL
POTASSIUM SERPL-SCNC: 4.8 MMOL/L
PROT SERPL-MCNC: 7.1 G/DL
SODIUM SERPL-SCNC: 141 MMOL/L
TRIGL SERPL-MCNC: 53 MG/DL

## 2022-04-22 ENCOUNTER — OUTPATIENT (OUTPATIENT)
Dept: OUTPATIENT SERVICES | Facility: HOSPITAL | Age: 77
LOS: 1 days | End: 2022-04-22
Payer: MEDICARE

## 2022-04-22 ENCOUNTER — APPOINTMENT (OUTPATIENT)
Dept: CV DIAGNOSITCS | Facility: HOSPITAL | Age: 77
End: 2022-04-22

## 2022-04-22 DIAGNOSIS — R01.1 CARDIAC MURMUR, UNSPECIFIED: ICD-10-CM

## 2022-04-22 PROCEDURE — 93306 TTE W/DOPPLER COMPLETE: CPT | Mod: 26

## 2022-05-31 ENCOUNTER — RX RENEWAL (OUTPATIENT)
Age: 77
End: 2022-05-31

## 2022-07-15 ENCOUNTER — APPOINTMENT (OUTPATIENT)
Dept: INTERNAL MEDICINE | Facility: CLINIC | Age: 77
End: 2022-07-15

## 2022-07-25 ENCOUNTER — RX RENEWAL (OUTPATIENT)
Age: 77
End: 2022-07-25

## 2022-11-11 ENCOUNTER — APPOINTMENT (OUTPATIENT)
Dept: INTERNAL MEDICINE | Facility: CLINIC | Age: 77
End: 2022-11-11

## 2022-11-11 VITALS
SYSTOLIC BLOOD PRESSURE: 134 MMHG | DIASTOLIC BLOOD PRESSURE: 68 MMHG | OXYGEN SATURATION: 98 % | HEART RATE: 95 BPM | HEIGHT: 63 IN | BODY MASS INDEX: 34.38 KG/M2 | WEIGHT: 194 LBS | TEMPERATURE: 98.3 F

## 2022-11-11 DIAGNOSIS — E66.01 MORBID (SEVERE) OBESITY DUE TO EXCESS CALORIES: ICD-10-CM

## 2022-11-11 DIAGNOSIS — M16.10 UNILATERAL PRIMARY OSTEOARTHRITIS, UNSPECIFIED HIP: ICD-10-CM

## 2022-11-11 DIAGNOSIS — R45.89 OTHER SYMPTOMS AND SIGNS INVOLVING EMOTIONAL STATE: ICD-10-CM

## 2022-11-11 PROCEDURE — 99214 OFFICE O/P EST MOD 30 MIN: CPT | Mod: 25

## 2022-11-11 PROCEDURE — 83036 HEMOGLOBIN GLYCOSYLATED A1C: CPT | Mod: QW

## 2022-11-11 NOTE — HISTORY OF PRESENT ILLNESS
[Other: _____] : [unfilled] [de-identified] : seen for F/u on ch medical issue - translated by Grand daughter \par \par has HHA - now gets 4 hrs only requesting more hrs \par \par c/o pain in both hips hx arthrotis - sedentary - trying to loose weight \par \par Memory problem- forgetting things were she kept - getting worse as per daughter - at times anxiety sp when she is alone - looking for increase in hours - has arthritis in hands and lower back with fatigue walking doing work in home \par \par Insomnia - unable to sleep well at night , takes longer time for her to sleep , watches TV before going to bed , no naps in day , sedentary sitting at home all day.\par - insurance will not be covering tamazepam in  \par \par Diabetic- monitors Accu-Cheks readings- 129-130 mainly, no values in 200, saw Ophth 2021 stable + retinopathy , and glaucoma , did b/l cataract Sx , denies any hypoglycemic episode. has not doen lazer rx for 2 ysr - she has to f/u on it pending \par refused flu vaccine and Pneumovax \par - taking metformin 1000 er qd\par saw ophtho 21\par \par Hyperlipidemia- taking cholesterol medication , no muscle pain. \par \par Hypertension- no CP , sob, palpitation or dizzy spells, compliant with medication , saw cardio Had stress and echo that were normal. No other complaints. Began taking asa 81 mg daily. \par \par GERD- acid reflux with certain foods occ. after eating , was see n in  for chest discomfort - her omeprazole was d/christopher and started on pepcid - she is doing well with it , no cp , son palpitations \par \par Obesity-lost 4 lbs since last visit , eating and not exercising \par \par Depression since 2016 spouse death \par - stable now , NO SI/HI \par - on Remeron 7.5 seem to work needs refill - no side effects - getting anxiety epsiodes while alone in home \par -- finding problems with every little things , depressed -low mood - sad feeling - not caring for self - lives alone at home since her   3 yrs ago -her daughter works full time - she does not want to go live with her \par - melatonin does not help \par  \par

## 2022-11-11 NOTE — ASSESSMENT
[FreeTextEntry1] : \par Daughter -Line Mayo -[phone] Pt wishes her daughter to be her Health care proxy - she wants CPR -Health care prxy forms given pt will sign and fax us back again \par \par Hip pain b/l hx arthritis and BMI 34 \par -PT referral given \par - sedentary - advisde walking 30 minutes daily \par \par Diabetes-POC AIC \par AIC 6.7 11/11/22 better /improving \par Ophtho saw 6/23/21 add invokana 100 qd \par -on metformin 1000 ER qd , rtc 3 months. Monitor Accu-Cheks daily, - Monitor for signs of hypoglycemia. \par Continue 1800 kcal ADA diet, avoid rice, pasta, sugar, sweet, soda, juices, advised to exercise daily.\par -advised patient to continue walking and exercise.and loose weight \par \par Hyperlipidemia\par Controlled, change to Atorvastatin 20 qhs 12/21 _ ASCVD risk 20.96 % - LFT ad lipids today \par Low-fat diet, avoid red meat, cheese, butter, peanuts and exercise daily for 40 minutes.\par \par Hypertension stable - stress test echo negative , cardio consult reviewed 7/2017 \par -Controlled, continue current medications, low sodium-DASH diet., discussed with patient avoid canned food, process food, fast food, also, advised to take 3-4 servings of fruits and vegetables a day.\par \par morbid obesity BMI 34- -> 35--> 34 \par - discussed with patient avoid carbohydrate and fat, encouraged patient to start exercising 40 minutes daily, lose weight.\par - advised GLP-1/ 2 inhibitor - InvoKana 100 qd - pt deferred will research and let me know next visit \par \par insomnia- sleep hygiene reviewed , reduce caffine intake after 7 pm \par \par mild cog impairment - on Nemenda 5 qhs - encouraged social interaction and keeping self busy and increase physical activity / exercise \par \par Depression/ anxiety - better now smiling more engaged -no side effects \par -stable Remeron 15 mg po qhs 6 months refill - no side effects \par -Depression screening done at this visit. 15 minute spent in assessment and review.\par -Denies homicidal or suicidal ideas or thoughts\par -Discussed with patient in detail side effects of all medications, hand out given, advise patient to inform family member that he or she is taking the medication and to watch out for any personality changes, to seek medical attention immediately if they notice any difference. \par -Make appointment to see psychiatrist and therapist on a regular basis.\par \par Osteopenia-due 2023 dexa\par - start Calcium 500-600 mg daily \par -start vitamin d 1000 units daily \par - do weight bearing exercises: walking with weights, stair climbing , weight training , jogging, aerobics etc \par -repeat test 2 yrs for a f/u\par \par Health Maintenance \par Flu, pneumovax, zostavax, tetanus vaccine - refused \par colonoscope- 2010, FIT 12/2021  neg, refused colonoscope , FIT ordered - advised colonoscope referral to gi given again \par dexa - 11/2020 Osteopenia due 2023 \par mammo- 1/2022 bi rad 2 \par PAP- up to date as per pt \par COVID Vaccine:Pfizer 4/18/21, 5//8/21. Booster- 1/10/22.

## 2022-11-14 ENCOUNTER — RX RENEWAL (OUTPATIENT)
Age: 77
End: 2022-11-14

## 2023-01-09 ENCOUNTER — RX RENEWAL (OUTPATIENT)
Age: 78
End: 2023-01-09

## 2023-02-06 ENCOUNTER — RX RENEWAL (OUTPATIENT)
Age: 78
End: 2023-02-06

## 2023-02-21 ENCOUNTER — APPOINTMENT (OUTPATIENT)
Dept: INTERNAL MEDICINE | Facility: CLINIC | Age: 78
End: 2023-02-21
Payer: MEDICARE

## 2023-02-21 VITALS
DIASTOLIC BLOOD PRESSURE: 65 MMHG | HEIGHT: 63 IN | BODY MASS INDEX: 34.38 KG/M2 | HEART RATE: 86 BPM | SYSTOLIC BLOOD PRESSURE: 97 MMHG | TEMPERATURE: 98.2 F | WEIGHT: 194 LBS | OXYGEN SATURATION: 97 %

## 2023-02-21 DIAGNOSIS — E11.319 TYPE 2 DIABETES MELLITUS WITH UNSPECIFIED DIABETIC RETINOPATHY W/OUT MACULAR EDEMA: Chronic | ICD-10-CM

## 2023-02-21 DIAGNOSIS — M79.672 PAIN IN LEFT FOOT: ICD-10-CM

## 2023-02-21 PROCEDURE — G0439: CPT

## 2023-02-21 PROCEDURE — 36415 COLL VENOUS BLD VENIPUNCTURE: CPT

## 2023-02-21 RX ORDER — CYCLOSPORINE 0.5 MG/ML
0.05 EMULSION OPHTHALMIC
Qty: 360 | Refills: 0 | Status: ACTIVE | COMMUNITY
Start: 2023-01-24

## 2023-02-21 NOTE — HISTORY OF PRESENT ILLNESS
[de-identified] : seen for AWV\par \par translated by Grand daughter \par \par has HHA - now gets 4 hrs only requesting more hrs \par \par c/o pain in both hips hx arthrotis - sedentary - trying to loose weight - doing Pt 2 x a week - helping a lot now -moving better now \par \par Memory problem- forgetting things were she kept - getting worse as per daughter - at times anxiety sp when she is alone - looking for increase in hours - has arthritis in hands and lower back with fatigue walking doing work in home \par \par Insomnia - unable to sleep well at night , takes longer time for her to sleep , watches TV before going to bed , no naps in day , sedentary sitting at home all day.\par - insurance will not be covering tamazepam in  \par \par Diabetic- monitors Accu-Cheks readings- 129-130 mainly, no values in 200, saw Ophth 2021 stable + retinopathy , and glaucoma , did b/l cataract Sx , denies any hypoglycemic episode. has not doen lazer rx for 2 ysr - she has to f/u on it pending \par refused flu vaccine and Pneumovax \par - taking metformin 1000 er qd\par saw ophtho 21\par \par Hyperlipidemia- taking cholesterol medication , no muscle pain. \par \par Hypertension- no CP , sob, palpitation or dizzy spells, compliant with medication , saw cardio Had stress and echo that were normal. No other complaints. Began taking asa 81 mg daily. \par \par GERD- acid reflux with certain foods occ. after eating , was see n in  for chest discomfort - her omeprazole was d/christopher and started on pepcid - she is doing well with it , no cp , son palpitations \par \par Obesity-lost 4 lbs since last visit , eating and not exercising \par \par Depression since 2016 spouse death \par - stable now , NO SI/HI \par - on Remeron 7.5 seem to work needs refill - no side effects - getting anxiety epsiodes while alone in home \par -- finding problems with every little things , depressed -low mood - sad feeling - not caring for self - lives alone at home since her   3 yrs ago -her daughter works full time - she does not want to go live with her \par - melatonin does not help \par

## 2023-02-21 NOTE — ASSESSMENT
[FreeTextEntry1] : Daughter -Line Mayo -[phone] Pt wishes her daughter to be her Health care proxy - she wants CPR -Health care prxy forms given pt will sign and fax us back again \par \par Hip pain b/l hx arthritis and BMI 34 \par -continue PT improving \par - sedentary - advisde walking 30 minutes daily \par \par Diabetes-get AIC \par AIC 6.7 11/11/22 better /improving \par Ophtho saw 6/23/21 add invokana 100 qd \par -on metformin 1000 ER qd , rtc 3 months. Monitor Accu-Cheks daily, - Monitor for signs of hypoglycemia. \par Continue 1800 kcal ADA diet, avoid rice, pasta, sugar, sweet, soda, juices, advised to exercise daily.\par -advised patient to continue walking and exercise.and loose weight \par \par Hyperlipidemia\par Controlled, change to Atorvastatin 20 qhs 12/21 _ ASCVD risk 20.96 % - LFT ad lipids today \par Low-fat diet, avoid red meat, cheese, butter, peanuts and exercise daily for 40 minutes.\par \par Hypertension stable - stress test echo negative , cardio consult reviewed 7/2017 \par -Controlled, continue current medications, low sodium-DASH diet., discussed with patient avoid canned food, process food, fast food, also, advised to take 3-4 servings of fruits and vegetables a day.\par \par morbid obesity BMI 34- -> 35--> 34 \par - discussed with patient avoid carbohydrate and fat, encouraged patient to start exercising 40 minutes daily, lose weight.\par - advised GLP-1/ 2 inhibitor - InvoKana 100 qd - pt deferred will research and let me know next visit \par \par insomnia- sleep hygiene reviewed , reduce caffine intake after 7 pm \par \par mild cog impairment - on Nemenda 5 qhs - encouraged social interaction and keeping self busy and increase physical activity / exercise \par \par Depression/ anxiety -low mood and not engaged \par -change to Remeron 30  mg po qhs 6 months refill - no side effects \par -Depression screening done at this visit. 15 minute spent in assessment and review.\par -Denies homicidal or suicidal ideas or thoughts\par -Discussed with patient in detail side effects of all medications, hand out given, advise patient to inform family member that he or she is taking the medication and to watch out for any personality changes, to seek medical attention immediately if they notice any difference. \par -Make appointment to see psychiatrist and therapist on a regular basis.\par \par Osteopenia-due 2023 dexa\par - start Calcium 500-600 mg daily \par -start vitamin d 1000 units daily \par - do weight bearing exercises: walking with weights, stair climbing , weight training , jogging, aerobics etc \par -repeat test 2 yrs for a f/u\par \par Health Maintenance \par Flu, pneumovax, zostavax, tetanus vaccine - refused \par colonoscope- 2010, FIT 12/2021 neg, refused colonoscope , FIT ordered - advised colonoscope referral to gi given again \par dexa - 11/2020 Osteopenia due 2023-ordered  \par mammo- 1/2022 bi rad 2 -ordered \par PAP- up to date as per pt \par COVID Vaccine:Pfizer 4/18/21, 5//8/21. Booster- 1/10/22. \par \par

## 2023-02-21 NOTE — HEALTH RISK ASSESSMENT
[Good] : ~his/her~  mood as  good [No] : In the past 12 months have you used drugs other than those required for medical reasons? No [No falls in past year] : Patient reported no falls in the past year [0] : 2) Feeling down, depressed, or hopeless: Not at all (0) [PHQ-2 Negative - No further assessment needed] : PHQ-2 Negative - No further assessment needed [With Family] : lives with family [Retired] : retired [] :  [Fully functional (bathing, dressing, toileting, transferring, walking, feeding)] : Fully functional (bathing, dressing, toileting, transferring, walking, feeding) [Full assistance needed] : managing finances [With Patient/Caregiver] : , with patient/caregiver [Designated Healthcare Proxy] : Designated healthcare proxy [Name: ___] : Health Care Proxy's Name: [unfilled]  [Relationship: ___] : Relationship: [unfilled] [de-identified] : walking  [[id number]] : 0 [Reports changes in hearing] : Reports no changes in hearing [Reports changes in vision] : Reports no changes in vision [Reports changes in dental health] : Reports no changes in dental health [AdvancecareDate] : 2/21/23

## 2023-02-22 LAB
25(OH)D3 SERPL-MCNC: 49.6 NG/ML
ALBUMIN SERPL ELPH-MCNC: 4.4 G/DL
ALP BLD-CCNC: 111 U/L
ALT SERPL-CCNC: 12 U/L
ANION GAP SERPL CALC-SCNC: 14 MMOL/L
APPEARANCE: CLEAR
AST SERPL-CCNC: 17 U/L
BASOPHILS # BLD AUTO: 0.05 K/UL
BASOPHILS NFR BLD AUTO: 0.5 %
BILIRUB SERPL-MCNC: 0.2 MG/DL
BILIRUBIN URINE: NEGATIVE
BLOOD URINE: NEGATIVE
BUN SERPL-MCNC: 20 MG/DL
CALCIUM SERPL-MCNC: 10.7 MG/DL
CHLORIDE SERPL-SCNC: 101 MMOL/L
CHOLEST SERPL-MCNC: 160 MG/DL
CO2 SERPL-SCNC: 24 MMOL/L
COLOR: NORMAL
CREAT SERPL-MCNC: 0.94 MG/DL
CREAT SPEC-SCNC: 72 MG/DL
EGFR: 62 ML/MIN/1.73M2
EOSINOPHIL # BLD AUTO: 0.11 K/UL
EOSINOPHIL NFR BLD AUTO: 1.1 %
ESTIMATED AVERAGE GLUCOSE: 151 MG/DL
GLUCOSE QUALITATIVE U: ABNORMAL
GLUCOSE SERPL-MCNC: 172 MG/DL
HBA1C MFR BLD HPLC: 6.9 %
HCT VFR BLD CALC: 38 %
HDLC SERPL-MCNC: 52 MG/DL
HGB BLD-MCNC: 12.6 G/DL
IMM GRANULOCYTES NFR BLD AUTO: 0.2 %
KETONES URINE: NEGATIVE
LDLC SERPL CALC-MCNC: 93 MG/DL
LEUKOCYTE ESTERASE URINE: NEGATIVE
LYMPHOCYTES # BLD AUTO: 3.84 K/UL
LYMPHOCYTES NFR BLD AUTO: 39.4 %
MAN DIFF?: NORMAL
MCHC RBC-ENTMCNC: 30.6 PG
MCHC RBC-ENTMCNC: 33.2 GM/DL
MCV RBC AUTO: 92.2 FL
MICROALBUMIN 24H UR DL<=1MG/L-MCNC: <1.2 MG/DL
MICROALBUMIN/CREAT 24H UR-RTO: NORMAL MG/G
MONOCYTES # BLD AUTO: 0.57 K/UL
MONOCYTES NFR BLD AUTO: 5.9 %
NEUTROPHILS # BLD AUTO: 5.15 K/UL
NEUTROPHILS NFR BLD AUTO: 52.9 %
NITRITE URINE: NEGATIVE
NONHDLC SERPL-MCNC: 108 MG/DL
PH URINE: 6
PLATELET # BLD AUTO: 373 K/UL
POTASSIUM SERPL-SCNC: 4.5 MMOL/L
PROT SERPL-MCNC: 7.6 G/DL
PROTEIN URINE: NORMAL
RBC # BLD: 4.12 M/UL
RBC # FLD: 13.7 %
SODIUM SERPL-SCNC: 140 MMOL/L
SPECIFIC GRAVITY URINE: 1.03
TRIGL SERPL-MCNC: 74 MG/DL
TSH SERPL-ACNC: 1.37 UIU/ML
UROBILINOGEN URINE: NORMAL
VIT B12 SERPL-MCNC: >2000 PG/ML
WBC # FLD AUTO: 9.74 K/UL

## 2023-04-13 ENCOUNTER — APPOINTMENT (OUTPATIENT)
Dept: MAMMOGRAPHY | Facility: IMAGING CENTER | Age: 78
End: 2023-04-13
Payer: MEDICARE

## 2023-04-13 ENCOUNTER — OUTPATIENT (OUTPATIENT)
Dept: OUTPATIENT SERVICES | Facility: HOSPITAL | Age: 78
LOS: 1 days | End: 2023-04-13
Payer: MEDICARE

## 2023-04-13 ENCOUNTER — RESULT REVIEW (OUTPATIENT)
Age: 78
End: 2023-04-13

## 2023-04-13 ENCOUNTER — APPOINTMENT (OUTPATIENT)
Dept: RADIOLOGY | Facility: IMAGING CENTER | Age: 78
End: 2023-04-13
Payer: MEDICARE

## 2023-04-13 DIAGNOSIS — Z00.00 ENCOUNTER FOR GENERAL ADULT MEDICAL EXAMINATION WITHOUT ABNORMAL FINDINGS: ICD-10-CM

## 2023-04-13 PROCEDURE — 77063 BREAST TOMOSYNTHESIS BI: CPT

## 2023-04-13 PROCEDURE — 77063 BREAST TOMOSYNTHESIS BI: CPT | Mod: 26

## 2023-04-13 PROCEDURE — 77080 DXA BONE DENSITY AXIAL: CPT | Mod: 26

## 2023-04-13 PROCEDURE — 77067 SCR MAMMO BI INCL CAD: CPT | Mod: 26

## 2023-04-13 PROCEDURE — 77080 DXA BONE DENSITY AXIAL: CPT

## 2023-04-13 PROCEDURE — 77067 SCR MAMMO BI INCL CAD: CPT

## 2023-05-01 ENCOUNTER — RX RENEWAL (OUTPATIENT)
Age: 78
End: 2023-05-01

## 2023-05-08 ENCOUNTER — RX RENEWAL (OUTPATIENT)
Age: 78
End: 2023-05-08

## 2023-07-20 ENCOUNTER — RX RENEWAL (OUTPATIENT)
Age: 78
End: 2023-07-20

## 2023-09-15 ENCOUNTER — RX RENEWAL (OUTPATIENT)
Age: 78
End: 2023-09-15

## 2023-10-12 ENCOUNTER — RX RENEWAL (OUTPATIENT)
Age: 78
End: 2023-10-12

## 2023-12-13 ENCOUNTER — APPOINTMENT (OUTPATIENT)
Dept: INTERNAL MEDICINE | Facility: CLINIC | Age: 78
End: 2023-12-13
Payer: MEDICARE

## 2023-12-13 VITALS
SYSTOLIC BLOOD PRESSURE: 117 MMHG | WEIGHT: 205 LBS | HEIGHT: 63 IN | DIASTOLIC BLOOD PRESSURE: 68 MMHG | BODY MASS INDEX: 36.32 KG/M2 | RESPIRATION RATE: 15 BRPM | OXYGEN SATURATION: 98 % | HEART RATE: 98 BPM

## 2023-12-13 DIAGNOSIS — N89.8 OTHER SPECIFIED NONINFLAMMATORY DISORDERS OF VAGINA: ICD-10-CM

## 2023-12-13 DIAGNOSIS — E11.9 TYPE 2 DIABETES MELLITUS W/OUT COMPLICATIONS: ICD-10-CM

## 2023-12-13 DIAGNOSIS — E78.5 HYPERLIPIDEMIA, UNSPECIFIED: ICD-10-CM

## 2023-12-13 DIAGNOSIS — I10 ESSENTIAL (PRIMARY) HYPERTENSION: ICD-10-CM

## 2023-12-13 PROCEDURE — 99214 OFFICE O/P EST MOD 30 MIN: CPT

## 2023-12-13 RX ORDER — BLOOD-GLUCOSE METER
70 EACH MISCELLANEOUS
Qty: 3 | Refills: 0 | Status: DISCONTINUED | COMMUNITY
Start: 2020-09-23 | End: 2023-12-13

## 2023-12-13 RX ORDER — MEMANTINE HYDROCHLORIDE 5 MG/1
5 TABLET, FILM COATED ORAL
Qty: 90 | Refills: 2 | Status: ACTIVE | COMMUNITY
Start: 2021-02-22 | End: 1900-01-01

## 2023-12-13 RX ORDER — CANAGLIFLOZIN 100 MG/1
100 TABLET, FILM COATED ORAL
Qty: 90 | Refills: 2 | Status: ACTIVE | COMMUNITY
Start: 2022-04-12 | End: 1900-01-01

## 2023-12-13 RX ORDER — FLUCONAZOLE 150 MG/1
150 TABLET ORAL
Qty: 2 | Refills: 0 | Status: ACTIVE | COMMUNITY
Start: 2023-12-13 | End: 1900-01-01

## 2023-12-13 RX ORDER — CLOTRIMAZOLE AND BETAMETHASONE DIPROPIONATE 10; .5 MG/G; MG/G
1-0.05 CREAM TOPICAL TWICE DAILY
Qty: 1 | Refills: 1 | Status: ACTIVE | COMMUNITY
Start: 2023-12-13 | End: 1900-01-01

## 2023-12-13 RX ORDER — MIRABEGRON 25 MG/1
25 TABLET, FILM COATED, EXTENDED RELEASE ORAL
Qty: 90 | Refills: 1 | Status: ACTIVE | COMMUNITY
Start: 2021-02-10 | End: 1900-01-01

## 2023-12-13 RX ORDER — CHROMIUM 200 MCG
25 MCG TABLET ORAL
Qty: 30 | Refills: 4 | Status: DISCONTINUED | COMMUNITY
Start: 2022-05-31 | End: 2023-12-13

## 2023-12-13 RX ORDER — MIRTAZAPINE 30 MG/1
30 TABLET, FILM COATED ORAL
Qty: 90 | Refills: 2 | Status: ACTIVE | COMMUNITY
Start: 2018-07-27 | End: 1900-01-01

## 2023-12-13 RX ORDER — OMEPRAZOLE 20 MG/1
20 CAPSULE, DELAYED RELEASE ORAL DAILY
Qty: 30 | Refills: 3 | Status: DISCONTINUED | COMMUNITY
Start: 2021-02-15 | End: 2023-12-13

## 2023-12-13 RX ORDER — METFORMIN HYDROCHLORIDE 1000 MG/1
1000 TABLET, FILM COATED, EXTENDED RELEASE ORAL
Qty: 90 | Refills: 3 | Status: DISCONTINUED | COMMUNITY
Start: 2023-02-15 | End: 2023-12-13

## 2023-12-13 RX ORDER — ATORVASTATIN CALCIUM 20 MG/1
20 TABLET, FILM COATED ORAL DAILY
Qty: 90 | Refills: 3 | Status: ACTIVE | COMMUNITY
Start: 2021-12-03 | End: 1900-01-01

## 2023-12-13 NOTE — PHYSICAL EXAM
[No Acute Distress] : no acute distress [Well-Appearing] : well-appearing [Normal Voice/Communication] : normal voice/communication [No Carotid Bruits] : no carotid bruits [Pedal Pulses Present] : the pedal pulses are present [No Edema] : there was no peripheral edema [No CVA Tenderness] : no CVA  tenderness [Grossly Normal Strength/Tone] : grossly normal strength/tone [No Spinal Tenderness] : no spinal tenderness [Coordination Grossly Intact] : coordination grossly intact [No Focal Deficits] : no focal deficits [Normal Gait] : normal gait [Normal] : affect was normal and insight and judgment were intact

## 2023-12-13 NOTE — HISTORY OF PRESENT ILLNESS
[de-identified] : 78 y.o. female, PMHx HTN, HLD, DM2, obesity, depression.   Presents for follow up, needs med refills.   Presents with daughter.   Feeling overall well.  Remains home on her own.   BP has been controlled.  BG also well controlled.   No dizziness, no falls. Occasional right hip pain.  Not affecting her walking.   Recently with vaginal itching.  No discharge.  No urinary symptoms.  No pelvic pain.   Sleep better with mirtazapine, but still some insomnia, occasionally takes melatonin.

## 2023-12-13 NOTE — ASSESSMENT
[FreeTextEntry1] : - DM2: A1C today with blood work Med refill sent [facility]  albumin:creatinine 6 mo ago, normal taking statin   HTN: Well controlled Meds refilled, BMP today  Depression: daughter reports about the same lonely since  passed suddenly 6 yr ago sleep better with Remeron  Memory impairment: Overall does not seem to be an issue.   No change with Memantine, prefers to continue for now.    Vaginitis: Declines to send BV swab today Will treat with fluconazole and topical F/u if not resolving

## 2023-12-18 LAB
25(OH)D3 SERPL-MCNC: 46.8 NG/ML
ALBUMIN SERPL ELPH-MCNC: 4.3 G/DL
ALP BLD-CCNC: 106 U/L
ALT SERPL-CCNC: 11 U/L
ANION GAP SERPL CALC-SCNC: 13 MMOL/L
AST SERPL-CCNC: 15 U/L
BILIRUB SERPL-MCNC: 0.2 MG/DL
BUN SERPL-MCNC: 17 MG/DL
CALCIUM SERPL-MCNC: 10.3 MG/DL
CHLORIDE SERPL-SCNC: 103 MMOL/L
CO2 SERPL-SCNC: 26 MMOL/L
CREAT SERPL-MCNC: 0.85 MG/DL
EGFR: 70 ML/MIN/1.73M2
ESTIMATED AVERAGE GLUCOSE: 146 MG/DL
GLUCOSE SERPL-MCNC: 155 MG/DL
HBA1C MFR BLD HPLC: 6.7 %
POTASSIUM SERPL-SCNC: 4.7 MMOL/L
PROT SERPL-MCNC: 7.2 G/DL
SODIUM SERPL-SCNC: 143 MMOL/L
VIT B12 SERPL-MCNC: >2000 PG/ML

## 2024-02-29 ENCOUNTER — RX RENEWAL (OUTPATIENT)
Age: 79
End: 2024-02-29

## 2024-02-29 RX ORDER — METFORMIN HYDROCHLORIDE 1000 MG/1
1000 TABLET, FILM COATED, EXTENDED RELEASE ORAL
Qty: 30 | Refills: 3 | Status: ACTIVE | COMMUNITY
Start: 2019-05-15 | End: 1900-01-01

## 2024-03-01 ENCOUNTER — APPOINTMENT (OUTPATIENT)
Dept: INTERNAL MEDICINE | Facility: CLINIC | Age: 79
End: 2024-03-01
Payer: MEDICARE

## 2024-03-01 VITALS
WEIGHT: 196 LBS | SYSTOLIC BLOOD PRESSURE: 115 MMHG | HEIGHT: 63 IN | HEART RATE: 103 BPM | OXYGEN SATURATION: 98 % | DIASTOLIC BLOOD PRESSURE: 73 MMHG | BODY MASS INDEX: 34.73 KG/M2

## 2024-03-01 DIAGNOSIS — M85.80 OTHER SPECIFIED DISORDERS OF BONE DENSITY AND STRUCTURE, UNSPECIFIED SITE: ICD-10-CM

## 2024-03-01 DIAGNOSIS — Z00.00 ENCOUNTER FOR GENERAL ADULT MEDICAL EXAMINATION W/OUT ABNORMAL FINDINGS: ICD-10-CM

## 2024-03-01 PROCEDURE — G0439: CPT

## 2024-03-01 PROCEDURE — 36415 COLL VENOUS BLD VENIPUNCTURE: CPT

## 2024-03-01 NOTE — PHYSICAL EXAM
[No Acute Distress] : no acute distress [Well-Appearing] : well-appearing [Normal Sclera/Conjunctiva] : normal sclera/conjunctiva [PERRL] : pupils equal round and reactive to light [Normal Outer Ear/Nose] : the outer ears and nose were normal in appearance [EOMI] : extraocular movements intact [Normal Oropharynx] : the oropharynx was normal [No JVD] : no jugular venous distention [No Lymphadenopathy] : no lymphadenopathy [Supple] : supple [No Respiratory Distress] : no respiratory distress  [Thyroid Normal, No Nodules] : the thyroid was normal and there were no nodules present [Normal Rate] : normal rate  [No Accessory Muscle Use] : no accessory muscle use [Clear to Auscultation] : lungs were clear to auscultation bilaterally [Regular Rhythm] : with a regular rhythm [Normal S1, S2] : normal S1 and S2 [No Murmur] : no murmur heard [No Varicosities] : no varicosities [Pedal Pulses Present] : the pedal pulses are present [No Edema] : there was no peripheral edema [No Extremity Clubbing/Cyanosis] : no extremity clubbing/cyanosis [Soft] : abdomen soft [Non Tender] : non-tender [Non-distended] : non-distended [No Masses] : no abdominal mass palpated [Normal Bowel Sounds] : normal bowel sounds [No HSM] : no HSM [Normal Posterior Cervical Nodes] : no posterior cervical lymphadenopathy [Normal Anterior Cervical Nodes] : no anterior cervical lymphadenopathy [No CVA Tenderness] : no CVA  tenderness [No Joint Swelling] : no joint swelling [No Spinal Tenderness] : no spinal tenderness [Grossly Normal Strength/Tone] : grossly normal strength/tone [No Rash] : no rash [No Focal Deficits] : no focal deficits [Coordination Grossly Intact] : coordination grossly intact [Deep Tendon Reflexes (DTR)] : deep tendon reflexes were 2+ and symmetric [Normal Gait] : normal gait [Normal Affect] : the affect was normal [Normal Insight/Judgement] : insight and judgment were intact

## 2024-03-01 NOTE — HEALTH RISK ASSESSMENT
[Good] : ~his/her~  mood as  good [No] : In the past 12 months have you used drugs other than those required for medical reasons? No [No falls in past year] : Patient reported no falls in the past year [0] : 2) Feeling down, depressed, or hopeless: Not at all (0) [PHQ-2 Negative - No further assessment needed] : PHQ-2 Negative - No further assessment needed [With Family] : lives with family [Retired] : retired [] :  [Fully functional (bathing, dressing, toileting, transferring, walking, feeding)] : Fully functional (bathing, dressing, toileting, transferring, walking, feeding) [Some assistance needed] : using telephone [Full assistance needed] : managing finances [With Patient/Caregiver] : , with patient/caregiver [Never] : Never [de-identified] : walking  [[id number]] : 0 [AdvancecareDate] : 3/1/24  [FreeTextEntry4] : Health Care Proxy's Name: Melani Huber [phone] . Relationship: Daughter .

## 2024-03-01 NOTE — HISTORY OF PRESENT ILLNESS
[de-identified] : seen for AWV\par  \par  translated by Grand daughter \par  \par  has HHA - now gets 4 hrs only requesting more hrs \par  \par  c/o pain in both hips hx arthrotis - sedentary - trying to loose weight - doing Pt 2 x a week - helping a lot now -moving better now \par  \par  Memory problem- forgetting things were she kept - getting worse as per daughter - at times anxiety sp when she is alone - looking for increase in hours - has arthritis in hands and lower back with fatigue walking doing work in home \par  \par  Insomnia - unable to sleep well at night , takes longer time for her to sleep , watches TV before going to bed , no naps in day , sedentary sitting at home all day.\par  - insurance will not be covering tamazepam in  \par  \par  Diabetic- monitors Accu-Cheks readings- 129-130 mainly, no values in 200, saw Ophth 2021 stable + retinopathy , and glaucoma , did b/l cataract Sx , denies any hypoglycemic episode. has not doen lazer rx for 2 ysr - she has to f/u on it pending \par  refused flu vaccine and Pneumovax \par  - taking metformin 1000 er qd\par  saw ophtho 21\par  \par  Hyperlipidemia- taking cholesterol medication , no muscle pain. \par  \par  Hypertension- no CP , sob, palpitation or dizzy spells, compliant with medication , saw cardio Had stress and echo that were normal. No other complaints. Began taking asa 81 mg daily. \par  \par  GERD- acid reflux with certain foods occ. after eating , was see n in  for chest discomfort - her omeprazole was d/christopher and started on pepcid - she is doing well with it , no cp , son palpitations \par  \par  Obesity-lost 4 lbs since last visit , eating and not exercising \par  \par  Depression since 2016 spouse death \par  - stable now , NO SI/HI \par  - on Remeron 7.5 seem to work needs refill - no side effects - getting anxiety epsiodes while alone in home \par  -- finding problems with every little things , depressed -low mood - sad feeling - not caring for self - lives alone at home since her   3 yrs ago -her daughter works full time - she does not want to go live with her \par  - melatonin does not help \par

## 2024-03-01 NOTE — ASSESSMENT
[FreeTextEntry1] : Daughter -[employer] -[phone] Pt wishes her daughter to be her Health care proxy - she wants CPR -Health care prxy forms given pt will sign and fax us back again   Hip pain b/l hx arthritis and BMI 34  -continue PT improving  - sedentary - advisde walking 30 minutes daily   Diabetes-get AIC  AIC 6.7 12/2023  better /improving  Ophtho saw 6/28/23  - add invokana 100 qd  -on metformin 1000 ER qd , rtc 3 months. Monitor Accu-Cheks daily, - Monitor for signs of hypoglycemia.  Continue 1800 kcal ADA diet, avoid rice, pasta, sugar, sweet, soda, juices, advised to exercise daily. -advised patient to continue walking and exercise.and loose weight   Hyperlipidemia Controlled, change to Atorvastatin 20 qhs 12/21 _ ASCVD risk 20.96 % - LFT ad lipids today  Low-fat diet, avoid red meat, cheese, butter, peanuts and exercise daily for 40 minutes.  Hypertension stable - stress test echo negative , cardio consult reviewed 7/2017  -Controlled, continue current medications, low sodium-DASH diet., discussed with patient avoid canned food, process food, fast food, also, advised to take 3-4 servings of fruits and vegetables a day.  morbid obesity BMI 34- -> 35--> 34  - discussed with patient avoid carbohydrate and fat, encouraged patient to start exercising 40 minutes daily, lose weight. - advised GLP-1/ 2 inhibitor - InvoKana 100 qd - pt deferred will research and let me know next visit   insomnia- sleep hygiene reviewed , reduce caffine intake after 7 pm   mild cog impairment - on Nemenda 5 qhs - encouraged social interaction and keeping self busy and increase physical activity / exercise   Depression/ anxiety -low mood and not engaged  -change to Remeron 30  mg po qhs 6 months refill - no side effects  -Depression screening done at this visit. 15 minute spent in assessment and review. -Denies homicidal or suicidal ideas or thoughts -Discussed with patient in detail side effects of all medications, hand out given, advise patient to inform family member that he or she is taking the medication and to watch out for any personality changes, to seek medical attention immediately if they notice any difference.  -Make appointment to see psychiatrist and therapist on a regular basis.  Osteopenia-4/ 2023 dexa - cont Calcium 500-600 mg daily ,  vitamin d 1000 units daily  - do weight bearing exercises: walking with weights, stair climbing , weight training , jogging, aerobics etc  -repeat test 2 yrs for a f/u  Health Maintenance  Flu, pneumovax, zostavax, tetanus vaccine - refused  colonoscope- 2010, FIT 12/2021 neg, refused colonoscope , FIT ordered - advised colonoscope referral to gi given again  dexa - 4/2023  Osteopenia  mammo- 4/2023  bi rad 2 -ordered  PAP- up to date as per pt  COVID Vaccine:Pfizer 4/18/21, 5//8/21. Booster- 1/10/22.

## 2024-03-04 LAB
ALBUMIN SERPL ELPH-MCNC: 4.2 G/DL
ALP BLD-CCNC: 119 U/L
ALT SERPL-CCNC: 11 U/L
ANION GAP SERPL CALC-SCNC: 15 MMOL/L
APPEARANCE: CLEAR
AST SERPL-CCNC: 13 U/L
BILIRUB SERPL-MCNC: 0.4 MG/DL
BILIRUBIN URINE: NEGATIVE
BLOOD URINE: NEGATIVE
BUN SERPL-MCNC: 17 MG/DL
CALCIUM SERPL-MCNC: 10.5 MG/DL
CHLORIDE SERPL-SCNC: 102 MMOL/L
CHOLEST SERPL-MCNC: 141 MG/DL
CO2 SERPL-SCNC: 25 MMOL/L
COLOR: YELLOW
CREAT SERPL-MCNC: 0.92 MG/DL
EGFR: 63 ML/MIN/1.73M2
ESTIMATED AVERAGE GLUCOSE: 157 MG/DL
GLUCOSE QUALITATIVE U: >=1000 MG/DL
GLUCOSE SERPL-MCNC: 163 MG/DL
HBA1C MFR BLD HPLC: 7.1 %
HCT VFR BLD CALC: 38.8 %
HDLC SERPL-MCNC: 52 MG/DL
HGB BLD-MCNC: 12.6 G/DL
KETONES URINE: NEGATIVE MG/DL
LDLC SERPL CALC-MCNC: 70 MG/DL
LEUKOCYTE ESTERASE URINE: NEGATIVE
MCHC RBC-ENTMCNC: 30.3 PG
MCHC RBC-ENTMCNC: 32.5 GM/DL
MCV RBC AUTO: 93.3 FL
NITRITE URINE: NEGATIVE
NONHDLC SERPL-MCNC: 89 MG/DL
PH URINE: 7.5
PLATELET # BLD AUTO: 360 K/UL
POTASSIUM SERPL-SCNC: 4.1 MMOL/L
PROT SERPL-MCNC: 7.3 G/DL
PROTEIN URINE: NEGATIVE MG/DL
RBC # BLD: 4.16 M/UL
RBC # FLD: 13.4 %
SODIUM SERPL-SCNC: 141 MMOL/L
SPECIFIC GRAVITY URINE: 1.03
TRIGL SERPL-MCNC: 104 MG/DL
TSH SERPL-ACNC: 0.67 UIU/ML
UROBILINOGEN URINE: 1 MG/DL
WBC # FLD AUTO: 10.04 K/UL

## 2024-04-09 ENCOUNTER — RX RENEWAL (OUTPATIENT)
Age: 79
End: 2024-04-09

## 2024-04-09 RX ORDER — MULTIVIT-MIN/FOLIC/VIT K/LYCOP 400-300MCG
25 MCG TABLET ORAL
Qty: 90 | Refills: 4 | Status: ACTIVE | COMMUNITY
Start: 2021-10-19 | End: 1900-01-01

## 2024-04-09 RX ORDER — OMEPRAZOLE, SODIUM BICARBONATE 20; 1680 MG/1; MG/1
20-1680 POWDER, FOR SUSPENSION ORAL
Qty: 90 | Refills: 4 | Status: ACTIVE | COMMUNITY
Start: 2021-03-12 | End: 1900-01-01

## 2024-05-28 ENCOUNTER — RESULT REVIEW (OUTPATIENT)
Age: 79
End: 2024-05-28

## 2024-05-28 ENCOUNTER — APPOINTMENT (OUTPATIENT)
Dept: MAMMOGRAPHY | Facility: IMAGING CENTER | Age: 79
End: 2024-05-28
Payer: MEDICARE

## 2024-05-28 ENCOUNTER — OUTPATIENT (OUTPATIENT)
Dept: OUTPATIENT SERVICES | Facility: HOSPITAL | Age: 79
LOS: 1 days | End: 2024-05-28
Payer: MEDICARE

## 2024-05-28 DIAGNOSIS — Z00.8 ENCOUNTER FOR OTHER GENERAL EXAMINATION: ICD-10-CM

## 2024-05-28 PROCEDURE — 77063 BREAST TOMOSYNTHESIS BI: CPT

## 2024-05-28 PROCEDURE — 77063 BREAST TOMOSYNTHESIS BI: CPT | Mod: 26

## 2024-05-28 PROCEDURE — 77067 SCR MAMMO BI INCL CAD: CPT

## 2024-05-28 PROCEDURE — 77067 SCR MAMMO BI INCL CAD: CPT | Mod: 26

## 2024-08-14 ENCOUNTER — RX RENEWAL (OUTPATIENT)
Age: 79
End: 2024-08-14

## 2024-09-11 ENCOUNTER — RX RENEWAL (OUTPATIENT)
Age: 79
End: 2024-09-11

## 2024-11-19 NOTE — REVIEW OF SYSTEMS
I have reviewed this patient's TCU admission History & Physical for medication related changes/recommendations identified by the admitting provider.  I am confirming that there are no recommendations requiring changes to medication orders are indicated at this time based on the provider recommendations in the H&P.    Key Fonseca, PharmD, BCPS  
[Negative] : Gastrointestinal

## 2024-12-03 ENCOUNTER — RX RENEWAL (OUTPATIENT)
Age: 79
End: 2024-12-03

## 2025-02-25 ENCOUNTER — RX RENEWAL (OUTPATIENT)
Age: 80
End: 2025-02-25

## 2025-03-04 ENCOUNTER — RX RENEWAL (OUTPATIENT)
Age: 80
End: 2025-03-04

## 2025-03-25 ENCOUNTER — RX RENEWAL (OUTPATIENT)
Age: 80
End: 2025-03-25

## 2025-03-27 ENCOUNTER — APPOINTMENT (OUTPATIENT)
Dept: INTERNAL MEDICINE | Facility: CLINIC | Age: 80
End: 2025-03-27
Payer: MEDICARE

## 2025-03-27 VITALS
HEIGHT: 63 IN | BODY MASS INDEX: 34.2 KG/M2 | OXYGEN SATURATION: 96 % | DIASTOLIC BLOOD PRESSURE: 84 MMHG | WEIGHT: 193 LBS | HEART RATE: 102 BPM | SYSTOLIC BLOOD PRESSURE: 135 MMHG | TEMPERATURE: 97 F

## 2025-03-27 DIAGNOSIS — R26.81 UNSTEADINESS ON FEET: ICD-10-CM

## 2025-03-27 DIAGNOSIS — Z00.00 ENCOUNTER FOR GENERAL ADULT MEDICAL EXAMINATION W/OUT ABNORMAL FINDINGS: ICD-10-CM

## 2025-03-27 PROCEDURE — G0439: CPT

## 2025-03-27 PROCEDURE — 36415 COLL VENOUS BLD VENIPUNCTURE: CPT

## 2025-03-28 DIAGNOSIS — E83.52 HYPERCALCEMIA: ICD-10-CM

## 2025-03-28 LAB
25(OH)D3 SERPL-MCNC: 47.7 NG/ML
ALBUMIN SERPL ELPH-MCNC: 4.3 G/DL
ALP BLD-CCNC: 118 U/L
ALT SERPL-CCNC: 11 U/L
ANION GAP SERPL CALC-SCNC: 12 MMOL/L
APPEARANCE: CLEAR
AST SERPL-CCNC: 15 U/L
BILIRUB SERPL-MCNC: 0.4 MG/DL
BILIRUBIN URINE: NEGATIVE
BLOOD URINE: NEGATIVE
BUN SERPL-MCNC: 24 MG/DL
CALCIUM SERPL-MCNC: 11 MG/DL
CHLORIDE SERPL-SCNC: 100 MMOL/L
CHOLEST SERPL-MCNC: 148 MG/DL
CO2 SERPL-SCNC: 28 MMOL/L
COLOR: YELLOW
CREAT SERPL-MCNC: 0.9 MG/DL
CREAT SPEC-SCNC: 55 MG/DL
EGFRCR SERPLBLD CKD-EPI 2021: 65 ML/MIN/1.73M2
ESTIMATED AVERAGE GLUCOSE: 146 MG/DL
GLUCOSE QUALITATIVE U: >=1000 MG/DL
GLUCOSE SERPL-MCNC: 178 MG/DL
HBA1C MFR BLD HPLC: 6.7 %
HCT VFR BLD CALC: 38.9 %
HDLC SERPL-MCNC: 55 MG/DL
HGB BLD-MCNC: 13.1 G/DL
KETONES URINE: NEGATIVE MG/DL
LDLC SERPL-MCNC: 80 MG/DL
LEUKOCYTE ESTERASE URINE: NEGATIVE
MCHC RBC-ENTMCNC: 30.5 PG
MCHC RBC-ENTMCNC: 33.7 G/DL
MCV RBC AUTO: 90.5 FL
MICROALBUMIN 24H UR DL<=1MG/L-MCNC: <1.2 MG/DL
MICROALBUMIN/CREAT 24H UR-RTO: NORMAL MG/G
NITRITE URINE: NEGATIVE
NONHDLC SERPL-MCNC: 93 MG/DL
PH URINE: 7.5
PLATELET # BLD AUTO: 379 K/UL
POTASSIUM SERPL-SCNC: 4.9 MMOL/L
PROT SERPL-MCNC: 7.4 G/DL
PROTEIN URINE: NEGATIVE MG/DL
RBC # BLD: 4.3 M/UL
RBC # FLD: 13.1 %
SODIUM SERPL-SCNC: 139 MMOL/L
SPECIFIC GRAVITY URINE: 1.02
TRIGL SERPL-MCNC: 61 MG/DL
TSH SERPL-ACNC: 1.42 UIU/ML
UROBILINOGEN URINE: 0.2 MG/DL
VIT B12 SERPL-MCNC: >2000 PG/ML
WBC # FLD AUTO: 10.84 K/UL

## 2025-05-20 ENCOUNTER — RX RENEWAL (OUTPATIENT)
Age: 80
End: 2025-05-20

## 2025-06-16 ENCOUNTER — RX RENEWAL (OUTPATIENT)
Age: 80
End: 2025-06-16

## 2025-09-19 ENCOUNTER — APPOINTMENT (OUTPATIENT)
Dept: INTERNAL MEDICINE | Facility: CLINIC | Age: 80
End: 2025-09-19
Payer: MEDICARE

## 2025-09-19 VITALS
BODY MASS INDEX: 32.96 KG/M2 | HEART RATE: 93 BPM | WEIGHT: 186 LBS | HEIGHT: 63 IN | TEMPERATURE: 97.6 F | OXYGEN SATURATION: 98 % | SYSTOLIC BLOOD PRESSURE: 122 MMHG | DIASTOLIC BLOOD PRESSURE: 76 MMHG

## 2025-09-19 DIAGNOSIS — M25.561 PAIN IN RIGHT KNEE: ICD-10-CM

## 2025-09-19 DIAGNOSIS — M25.562 PAIN IN RIGHT KNEE: ICD-10-CM

## 2025-09-19 DIAGNOSIS — I10 ESSENTIAL (PRIMARY) HYPERTENSION: ICD-10-CM

## 2025-09-19 DIAGNOSIS — E11.319 TYPE 2 DIABETES MELLITUS WITH UNSPECIFIED DIABETIC RETINOPATHY W/OUT MACULAR EDEMA: ICD-10-CM

## 2025-09-19 DIAGNOSIS — E11.9 TYPE 2 DIABETES MELLITUS W/OUT COMPLICATIONS: ICD-10-CM

## 2025-09-19 DIAGNOSIS — E78.5 HYPERLIPIDEMIA, UNSPECIFIED: ICD-10-CM

## 2025-09-19 DIAGNOSIS — R26.81 UNSTEADINESS ON FEET: ICD-10-CM

## 2025-09-19 PROCEDURE — 36415 COLL VENOUS BLD VENIPUNCTURE: CPT

## 2025-09-19 PROCEDURE — 99214 OFFICE O/P EST MOD 30 MIN: CPT

## 2025-09-19 PROCEDURE — G2211 COMPLEX E/M VISIT ADD ON: CPT

## 2025-09-22 LAB
ALBUMIN SERPL ELPH-MCNC: 4.1 G/DL
ALP BLD-CCNC: 121 U/L
ALT SERPL-CCNC: 14 U/L
ANION GAP SERPL CALC-SCNC: 13 MMOL/L
AST SERPL-CCNC: 17 U/L
BILIRUB SERPL-MCNC: 0.6 MG/DL
BUN SERPL-MCNC: 17 MG/DL
CALCIUM SERPL-MCNC: 10.2 MG/DL
CALCIUM SERPL-MCNC: 10.3 MG/DL
CHLORIDE SERPL-SCNC: 104 MMOL/L
CO2 SERPL-SCNC: 25 MMOL/L
CREAT SERPL-MCNC: 0.97 MG/DL
EGFRCR SERPLBLD CKD-EPI 2021: 59 ML/MIN/1.73M2
ESTIMATED AVERAGE GLUCOSE: 154 MG/DL
GLUCOSE SERPL-MCNC: 138 MG/DL
HBA1C MFR BLD HPLC: 7 %
PARATHYROID HORMONE INTACT: 27 PG/ML
POTASSIUM SERPL-SCNC: 4.2 MMOL/L
PROT SERPL-MCNC: 7.1 G/DL
SODIUM SERPL-SCNC: 142 MMOL/L